# Patient Record
Sex: MALE | Race: BLACK OR AFRICAN AMERICAN | HISPANIC OR LATINO | Employment: OTHER | URBAN - METROPOLITAN AREA
[De-identification: names, ages, dates, MRNs, and addresses within clinical notes are randomized per-mention and may not be internally consistent; named-entity substitution may affect disease eponyms.]

---

## 2023-10-05 ENCOUNTER — APPOINTMENT (EMERGENCY)
Dept: CT IMAGING | Facility: HOSPITAL | Age: 63
End: 2023-10-05
Payer: COMMERCIAL

## 2023-10-05 ENCOUNTER — HOSPITAL ENCOUNTER (OUTPATIENT)
Facility: HOSPITAL | Age: 63
Setting detail: OBSERVATION
Discharge: HOME | End: 2023-10-06
Attending: EMERGENCY MEDICINE | Admitting: INTERNAL MEDICINE
Payer: COMMERCIAL

## 2023-10-05 DIAGNOSIS — E86.0 DEHYDRATION: ICD-10-CM

## 2023-10-05 DIAGNOSIS — R93.89 ABNORMAL COMPUTED TOMOGRAPHY ANGIOGRAPHY (CTA): ICD-10-CM

## 2023-10-05 DIAGNOSIS — R55 SYNCOPE: Primary | ICD-10-CM

## 2023-10-05 DIAGNOSIS — N17.9 AKI (ACUTE KIDNEY INJURY) (HCC): ICD-10-CM

## 2023-10-05 DIAGNOSIS — R45.1 AGITATION: ICD-10-CM

## 2023-10-05 DIAGNOSIS — Z86.73 CHRONIC CEREBROVASCULAR ACCIDENT (CVA): ICD-10-CM

## 2023-10-05 DIAGNOSIS — R55 SYNCOPE AND COLLAPSE: ICD-10-CM

## 2023-10-05 LAB
2HR DELTA HS TROPONIN: -1 NG/L
4HR DELTA HS TROPONIN: 8 NG/L
ALBUMIN SERPL BCP-MCNC: 4.2 G/DL (ref 3.5–5)
ALP SERPL-CCNC: 73 U/L (ref 34–104)
ALT SERPL W P-5'-P-CCNC: 9 U/L (ref 7–52)
ANION GAP SERPL CALCULATED.3IONS-SCNC: 7 MMOL/L
APTT PPP: 28 SECONDS (ref 23–37)
AST SERPL W P-5'-P-CCNC: 11 U/L (ref 13–39)
BASOPHILS # BLD AUTO: 0.05 THOUSANDS/ÂΜL (ref 0–0.1)
BASOPHILS NFR BLD AUTO: 0 % (ref 0–1)
BILIRUB SERPL-MCNC: 0.58 MG/DL (ref 0.2–1)
BNP SERPL-MCNC: 22 PG/ML (ref 0–100)
BUN SERPL-MCNC: 16 MG/DL (ref 5–25)
CALCIUM SERPL-MCNC: 9.6 MG/DL (ref 8.4–10.2)
CARDIAC TROPONIN I PNL SERPL HS: 11 NG/L
CARDIAC TROPONIN I PNL SERPL HS: 2 NG/L
CARDIAC TROPONIN I PNL SERPL HS: 3 NG/L
CHLORIDE SERPL-SCNC: 100 MMOL/L (ref 96–108)
CO2 SERPL-SCNC: 30 MMOL/L (ref 21–32)
CREAT SERPL-MCNC: 1.38 MG/DL (ref 0.6–1.3)
EOSINOPHIL # BLD AUTO: 0.17 THOUSAND/ÂΜL (ref 0–0.61)
EOSINOPHIL NFR BLD AUTO: 1 % (ref 0–6)
ERYTHROCYTE [DISTWIDTH] IN BLOOD BY AUTOMATED COUNT: 13.1 % (ref 11.6–15.1)
GFR SERPL CREATININE-BSD FRML MDRD: 53 ML/MIN/1.73SQ M
GLUCOSE SERPL-MCNC: 194 MG/DL (ref 65–140)
HCT VFR BLD AUTO: 44.1 % (ref 36.5–49.3)
HGB BLD-MCNC: 14 G/DL (ref 12–17)
IMM GRANULOCYTES # BLD AUTO: 0.07 THOUSAND/UL (ref 0–0.2)
IMM GRANULOCYTES NFR BLD AUTO: 0 % (ref 0–2)
INR PPP: 1.18 (ref 0.84–1.19)
LYMPHOCYTES # BLD AUTO: 1.42 THOUSANDS/ÂΜL (ref 0.6–4.47)
LYMPHOCYTES NFR BLD AUTO: 9 % (ref 14–44)
MAGNESIUM SERPL-MCNC: 1.6 MG/DL (ref 1.9–2.7)
MCH RBC QN AUTO: 28.6 PG (ref 26.8–34.3)
MCHC RBC AUTO-ENTMCNC: 31.7 G/DL (ref 31.4–37.4)
MCV RBC AUTO: 90 FL (ref 82–98)
MONOCYTES # BLD AUTO: 0.85 THOUSAND/ÂΜL (ref 0.17–1.22)
MONOCYTES NFR BLD AUTO: 5 % (ref 4–12)
NEUTROPHILS # BLD AUTO: 13.09 THOUSANDS/ÂΜL (ref 1.85–7.62)
NEUTS SEG NFR BLD AUTO: 85 % (ref 43–75)
NRBC BLD AUTO-RTO: 0 /100 WBCS
PLATELET # BLD AUTO: 286 THOUSANDS/UL (ref 149–390)
PMV BLD AUTO: 12 FL (ref 8.9–12.7)
POTASSIUM SERPL-SCNC: 4.2 MMOL/L (ref 3.5–5.3)
PROT SERPL-MCNC: 8.1 G/DL (ref 6.4–8.4)
PROTHROMBIN TIME: 15 SECONDS (ref 11.6–14.5)
RBC # BLD AUTO: 4.9 MILLION/UL (ref 3.88–5.62)
SODIUM SERPL-SCNC: 137 MMOL/L (ref 135–147)
TSH SERPL DL<=0.05 MIU/L-ACNC: 2.24 UIU/ML (ref 0.45–4.5)
WBC # BLD AUTO: 15.65 THOUSAND/UL (ref 4.31–10.16)

## 2023-10-05 PROCEDURE — 84484 ASSAY OF TROPONIN QUANT: CPT | Performed by: EMERGENCY MEDICINE

## 2023-10-05 PROCEDURE — 83880 ASSAY OF NATRIURETIC PEPTIDE: CPT | Performed by: EMERGENCY MEDICINE

## 2023-10-05 PROCEDURE — 93005 ELECTROCARDIOGRAM TRACING: CPT

## 2023-10-05 PROCEDURE — 99284 EMERGENCY DEPT VISIT MOD MDM: CPT

## 2023-10-05 PROCEDURE — 85730 THROMBOPLASTIN TIME PARTIAL: CPT | Performed by: EMERGENCY MEDICINE

## 2023-10-05 PROCEDURE — 83735 ASSAY OF MAGNESIUM: CPT | Performed by: EMERGENCY MEDICINE

## 2023-10-05 PROCEDURE — 85610 PROTHROMBIN TIME: CPT | Performed by: EMERGENCY MEDICINE

## 2023-10-05 PROCEDURE — 96361 HYDRATE IV INFUSION ADD-ON: CPT

## 2023-10-05 PROCEDURE — 99223 1ST HOSP IP/OBS HIGH 75: CPT | Performed by: NURSE PRACTITIONER

## 2023-10-05 PROCEDURE — 36415 COLL VENOUS BLD VENIPUNCTURE: CPT | Performed by: EMERGENCY MEDICINE

## 2023-10-05 PROCEDURE — 70498 CT ANGIOGRAPHY NECK: CPT

## 2023-10-05 PROCEDURE — 84443 ASSAY THYROID STIM HORMONE: CPT | Performed by: EMERGENCY MEDICINE

## 2023-10-05 PROCEDURE — 70496 CT ANGIOGRAPHY HEAD: CPT

## 2023-10-05 PROCEDURE — 80053 COMPREHEN METABOLIC PANEL: CPT | Performed by: EMERGENCY MEDICINE

## 2023-10-05 PROCEDURE — G1004 CDSM NDSC: HCPCS

## 2023-10-05 PROCEDURE — 96374 THER/PROPH/DIAG INJ IV PUSH: CPT

## 2023-10-05 PROCEDURE — 85025 COMPLETE CBC W/AUTO DIFF WBC: CPT | Performed by: EMERGENCY MEDICINE

## 2023-10-05 PROCEDURE — 99285 EMERGENCY DEPT VISIT HI MDM: CPT | Performed by: EMERGENCY MEDICINE

## 2023-10-05 RX ORDER — MECLIZINE HYDROCHLORIDE 25 MG/1
25 TABLET ORAL ONCE
Status: COMPLETED | OUTPATIENT
Start: 2023-10-05 | End: 2023-10-05

## 2023-10-05 RX ORDER — ONDANSETRON 2 MG/ML
4 INJECTION INTRAMUSCULAR; INTRAVENOUS ONCE
Status: COMPLETED | OUTPATIENT
Start: 2023-10-05 | End: 2023-10-05

## 2023-10-05 RX ORDER — LISINOPRIL 30 MG/1
30 TABLET ORAL DAILY
COMMUNITY

## 2023-10-05 RX ORDER — UREA 10 %
500 LOTION (ML) TOPICAL ONCE
Status: COMPLETED | OUTPATIENT
Start: 2023-10-05 | End: 2023-10-05

## 2023-10-05 RX ORDER — ATORVASTATIN CALCIUM 40 MG/1
40 TABLET, FILM COATED ORAL DAILY
COMMUNITY

## 2023-10-05 RX ORDER — CHLORTHALIDONE 25 MG/1
25 TABLET ORAL DAILY
COMMUNITY

## 2023-10-05 RX ORDER — METOPROLOL TARTRATE 50 MG/1
50 TABLET, FILM COATED ORAL EVERY 12 HOURS SCHEDULED
COMMUNITY

## 2023-10-05 RX ADMIN — IOHEXOL 85 ML: 350 INJECTION, SOLUTION INTRAVENOUS at 20:19

## 2023-10-05 RX ADMIN — ONDANSETRON 4 MG: 2 INJECTION INTRAMUSCULAR; INTRAVENOUS at 20:41

## 2023-10-05 RX ADMIN — Medication 500 MG: at 21:02

## 2023-10-05 RX ADMIN — SODIUM CHLORIDE 2000 ML: 0.9 INJECTION, SOLUTION INTRAVENOUS at 19:45

## 2023-10-05 RX ADMIN — MECLIZINE HYDROCHLORIDE 25 MG: 25 TABLET ORAL at 19:44

## 2023-10-05 NOTE — ED PROVIDER NOTES
History  Chief Complaint   Patient presents with   • Syncope     Family reports pt had syncopal episode earlier in the day, witnessed, did not injure self. Family states pt stood and "fainted". Pt denies any complaints. Family reports pt seems more fatigued than usual.     Patient is a 48-year-old male here with wife who is English-speaking only as well as patient and daughter who helps translate. Patient with a history of diabetes, hypothyroidism, hypertension hyperlipidemia came in today after syncopal event at home. Patient's wife witnessed this. She states that patient was standing up saying that he had to go have a bowel movement where he became dizzy, fatigued and hot and then passed out. Patient's wife lowered him to the ground he did not hit his head or lose consciousness. He was out several seconds but regained consciousness. No seizure-like activity. No tongue biting or loss of urine. No recent fevers, chills, night sweats. He has been taking his medications as prescribed without any changes in his medications. Patient and wife both deny that patient was complaining of any chest pain palpitations, lower extremity edema, tenderness. He had no focal weakness of the bilateral upper extremities or lower extremities. History provided by:  Patient, medical records and relative   used: Yes    Syncope  Episode history:  Single  Most recent episode:   Today  Timing:  Rare  Progression:  Resolved  Chronicity:  New  Context: standing up    Context: not blood draw, not bowel movement, not dehydration, not exertion, not inactivity, not medication change, not with normal activity, not sight of blood, not sitting down and not urination    Witnessed: yes    Relieved by:  None tried  Worsened by:  Nothing  Ineffective treatments:  None tried  Associated symptoms: dizziness and malaise/fatigue    Associated symptoms: no anxiety, no chest pain, no confusion, no diaphoresis, no difficulty breathing, no fever, no focal sensory loss, no focal weakness, no headaches, no nausea, no palpitations, no recent fall, no recent injury, no recent surgery, no rectal bleeding, no seizures, no shortness of breath, no visual change, no vomiting and no weakness    Risk factors: no congenital heart disease, no coronary artery disease, no seizures and no vascular disease        Prior to Admission Medications   Prescriptions Last Dose Informant Patient Reported? Taking?   atorvastatin (LIPITOR) 40 mg tablet   Yes Yes   Sig: Take 40 mg by mouth daily   chlorthalidone 25 mg tablet   Yes Yes   Sig: Take 25 mg by mouth daily   lisinopril (ZESTRIL) 30 mg tablet   Yes Yes   Sig: Take 30 mg by mouth daily   metFORMIN (GLUCOPHAGE) 1000 MG tablet   Yes Yes   Sig: Take 1,000 mg by mouth 2 (two) times a day with meals   metoprolol tartrate (LOPRESSOR) 50 mg tablet   Yes Yes   Sig: Take 50 mg by mouth every 12 (twelve) hours   sitaGLIPtin (JANUVIA) 100 mg tablet   Yes Yes   Sig: Take 100 mg by mouth daily      Facility-Administered Medications: None       Past Medical History:   Diagnosis Date   • Blind    • Diabetes mellitus ([address])    • Disease of thyroid gland    • Hyperlipidemia    • Hypertension        History reviewed. No pertinent surgical history. History reviewed. No pertinent family history. I have reviewed and agree with the history as documented. E-Cigarette/Vaping   • E-Cigarette Use Never User      E-Cigarette/Vaping Substances     Social History     Tobacco Use   • Smoking status: Never   • Smokeless tobacco: Never   Vaping Use   • Vaping Use: Never used   Substance Use Topics   • Alcohol use: Not Currently   • Drug use: Not Currently       Review of Systems   Constitutional: Positive for malaise/fatigue. Negative for diaphoresis and fever. HENT: Negative. Eyes: Negative. Respiratory: Negative. Negative for shortness of breath. Cardiovascular: Positive for syncope.  Negative for chest pain and palpitations. Gastrointestinal: Negative. Negative for nausea and vomiting. Musculoskeletal: Negative. Skin: Negative. Neurological: Positive for dizziness and syncope. Negative for focal weakness, seizures, weakness and headaches. Hematological: Negative. Psychiatric/Behavioral: Negative. Negative for confusion. All other systems reviewed and are negative. Physical Exam  Physical Exam  Vitals and nursing note reviewed. Constitutional:       General: He is not in acute distress. Appearance: He is well-developed. Comments: Patient appears older than stated   HENT:      Head: Normocephalic and atraumatic. Comments: Patient maintaining airway and secretions. No stridor . No brawniness under tongue. Nose: Nose normal.      Mouth/Throat:      Mouth: Mucous membranes are dry. Eyes:      Extraocular Movements: Extraocular movements intact. Conjunctiva/sclera: Conjunctivae normal.      Pupils: Pupils are equal, round, and reactive to light. Cardiovascular:      Rate and Rhythm: Normal rate and regular rhythm. Pulses:           Radial pulses are 2+ on the right side and 2+ on the left side. Dorsalis pedis pulses are 2+ on the right side and 2+ on the left side. Heart sounds: Normal heart sounds, S1 normal and S2 normal. No murmur heard. Pulmonary:      Effort: Pulmonary effort is normal. No respiratory distress. Breath sounds: Normal breath sounds. Abdominal:      Palpations: Abdomen is soft. Tenderness: There is no abdominal tenderness. Musculoskeletal:         General: No swelling. Normal range of motion. Cervical back: Neck supple. Right lower leg: No edema. Left lower leg: No edema. Skin:     General: Skin is warm and dry. Capillary Refill: Capillary refill takes less than 2 seconds. Neurological:      General: No focal deficit present. Mental Status: He is alert and oriented to person, place, and time. GCS: GCS eye subscore is 4. GCS verbal subscore is 5. GCS motor subscore is 6. Cranial Nerves: Cranial nerves 2-12 are intact. Sensory: Sensation is intact. Motor: Motor function is intact. Coordination: Coordination is intact. Comments: No slurred speech. No facial asymmetry. No tongue deviation. Patient can move bilateral extremities and lower extremities independently and spontaneously bilateral pain-free. Patient with independent bed mobility as well   Psychiatric:         Mood and Affect: Mood normal.         Behavior: Behavior normal.         Thought Content:  Thought content normal.         Judgment: Judgment normal.         Vital Signs  ED Triage Vitals   Temperature Pulse Respirations Blood Pressure SpO2   10/05/23 1823 10/05/23 1822 10/05/23 1822 10/05/23 1822 10/05/23 1822   98.3 °F (36.8 °C) 76 16 143/83 99 %      Temp src Heart Rate Source Patient Position - Orthostatic VS BP Location FiO2 (%)   -- 10/05/23 1822 10/05/23 1822 10/05/23 1822 --    Monitor Lying Right arm       Pain Score       10/05/23 1822       No Pain           Vitals:    10/05/23 1822 10/05/23 2130 10/05/23 2200   BP: 143/83 146/88 146/82   Pulse: 76 80 80   Patient Position - Orthostatic VS: Lying Lying Lying         Visual Acuity      ED Medications  Medications   sodium chloride 0.9 % bolus 2,000 mL (0 mL Intravenous Stopped 10/5/23 2149)   meclizine (ANTIVERT) tablet 25 mg (25 mg Oral Given 10/5/23 1944)   magnesium gluconate (MAGONATE) tablet 500 mg (500 mg Oral Given 10/5/23 2102)   iohexol (OMNIPAQUE) 350 MG/ML injection (MULTI-DOSE) 85 mL (85 mL Intravenous Given 10/5/23 2019)   ondansetron (ZOFRAN) injection 4 mg (4 mg Intravenous Given 10/5/23 2041)       Diagnostic Studies  Results Reviewed     Procedure Component Value Units Date/Time    HS Troponin I 2hr [[id number]] Collected: 10/05/23 2101    Lab Status: No result Specimen: Blood from Line, Venous     B-Type Natriuretic Peptide(BNP) [[id number]]  (Normal) Collected: 10/05/23 1901    Lab Status: Final result Specimen: Blood from Arm, Left Updated: 10/05/23 2015     BNP 22 pg/mL     HS Troponin I 4hr [[id number]]     Lab Status: No result Specimen: Blood     TSH [[id number]]  (Normal) Collected: 10/05/23 1901    Lab Status: Final result Specimen: Blood from Arm, Left Updated: 10/05/23 2000     TSH 3RD GENERATON 2.239 uIU/mL     Comprehensive metabolic panel [[id number]]  (Abnormal) Collected: 10/05/23 1901    Lab Status: Final result Specimen: Blood from Arm, Left Updated: 10/05/23 1950     Sodium 137 mmol/L      Potassium 4.2 mmol/L      Chloride 100 mmol/L      CO2 30 mmol/L      ANION GAP 7 mmol/L      BUN 16 mg/dL      Creatinine 1.38 mg/dL      Glucose 194 mg/dL      Calcium 9.6 mg/dL      AST 11 U/L      ALT 9 U/L      Alkaline Phosphatase 73 U/L      Total Protein 8.1 g/dL      Albumin 4.2 g/dL      Total Bilirubin 0.58 mg/dL      eGFR 53 ml/min/1.73sq m     Narrative:      [place] guidelines for Chronic Kidney Disease (CKD):   •  Stage 1 with normal or high GFR (GFR > 90 mL/min/1.73 square meters)  •  Stage 2 Mild CKD (GFR = 60-89 mL/min/1.73 square meters)  •  Stage 3A Moderate CKD (GFR = 45-59 mL/min/1.73 square meters)  •  Stage 3B Moderate CKD (GFR = 30-44 mL/min/1.73 square meters)  •  Stage 4 Severe CKD (GFR = 15-29 mL/min/1.73 square meters)  •  Stage 5 End Stage CKD (GFR <15 mL/min/1.73 square meters)  Note: GFR calculation is accurate only with a steady state creatinine    Magnesium [[id number]]  (Abnormal) Collected: 10/05/23 1901    Lab Status: Final result Specimen: Blood from Arm, Left Updated: 10/05/23 1950     Magnesium 1.6 mg/dL     HS Troponin 0hr (reflex protocol) [[id number]]  (Normal) Collected: 10/05/23 1901    Lab Status: Final result Specimen: Blood from Arm, Left Updated: 10/05/23 1949     hs TnI 0hr 3 ng/L     Protime-INR [[id number]]  (Abnormal) Collected: 10/05/23 1901    Lab Status: Final result Specimen: Blood from Arm, Left Updated: 10/05/23 1942     Protime 15.0 seconds      INR 1.18    APTT [[id number]]  (Normal) Collected: 10/05/23 1901    Lab Status: Final result Specimen: Blood from Arm, Left Updated: 10/05/23 1942     PTT 28 seconds     CBC and differential [[id number]]  (Abnormal) Collected: 10/05/23 1901    Lab Status: Final result Specimen: Blood from Arm, Left Updated: 10/05/23 1922     WBC 15.65 Thousand/uL      RBC 4.90 Million/uL      Hemoglobin 14.0 g/dL      Hematocrit 44.1 %      MCV 90 fL      MCH 28.6 pg      MCHC 31.7 g/dL      RDW 13.1 %      MPV 12.0 fL      Platelets 419 Thousands/uL      nRBC 0 /100 WBCs      Neutrophils Relative 85 %      Immat GRANS % 0 %      Lymphocytes Relative 9 %      Monocytes Relative 5 %      Eosinophils Relative 1 %      Basophils Relative 0 %      Neutrophils Absolute 13.09 Thousands/µL      Immature Grans Absolute 0.07 Thousand/uL      Lymphocytes Absolute 1.42 Thousands/µL      Monocytes Absolute 0.85 Thousand/µL      Eosinophils Absolute 0.17 Thousand/µL      Basophils Absolute 0.05 Thousands/µL                  CTA head and neck with and without contrast   Final Result by Rolando Ramos MD (10/05 2117)      Age-indeterminate lacunar infarct in right heidy - no comparison imaging at time of examination. This may be subacute or chronic in age. Recommend MRI brain without contrast for further evaluation. Severe chronic microangiopathy. Negative CTA head and neck for large vessel occlusion, dissection, aneurysm, or high-grade stenosis. Additional chronic/incidental findings as detailed above. The study was marked in [facility] for immediate notification. Workstation performed: [id number]                    Procedures  Procedures         ED Course  ED Course as of 10/05/23 2230   Thu Oct 05, 2023   1921 Patient is a 58 year male coming in today after syncopal event at home.   On exam alert oriented x3 no focal deficits. 0. We will start cardiac walker as well as CT angio head and neck. Patient family agreeable      Patient with no abnormal EKG    Disclosure: Voice to text software was used in the preparation of this document and could have resulted in translational errors.      Occasional wrong word or "sound a like" substitutions may have occurred due to the inherent limitations of voice recognition software.  Read the chart carefully and recognize, using context, where substitutions have occurred.       I have independently reviewed external records are available to me to the level of detail possible within the time constraints of my patient care responsibilities in the ED.       2006 Perative care everywhere patient's last creatinine was 0.8 in January 2023 now increased to 1.38. We will continue with 2 L IV fluid. Magnesium mildly low as well. Mild leukocytosis without evident evidence of bacteremia. 2036 Report that patient came back from CT and vomited. Will give Zofran. 2055 Patient resting in bed and states that he had a CT and just felt warm and vomited. He had no rashes or shortness of breath. No angioedema noted. Patient feels better after IV fluids and Zofran. 2156 Patient and family updated on CT results and agreeable. Patient's last CT head without contrast was in August 2022. No acute findings at that time except for cerebral atrophy. They are agreeable. We will reach out to spine for further stroke work-up   2215 Discussed with Ryann Waldron. We had a detailed discussion of the patient's condition and case,  including need for admission. Accepts to his/her service. Bed request/bridging orders placed.                  HEART Risk Score    Flowsheet Row Most Recent Value   Heart Score Risk Calculator    History 0 Filed at: 10/05/2023 2008   ECG 1 Filed at: 10/05/2023 2008   Age 1 Filed at: 10/05/2023 2008   Risk Factors 2 Filed at: 10/05/2023 2008   Troponin 0 Filed at: 10/05/2023 2008   HEART Score 4 Filed at: 10/05/2023 2008           Stroke Assessment     Row Name 10/05/23 1933             NIH Stroke Scale    Interval Baseline      Level of Consciousness (1a.) 0      LOC Questions (1b.) 0      LOC Commands (1c.) 0      Best Gaze (2.) 0      Visual (3.) 0      Facial Palsy (4.) 0      Motor Arm, Left (5a.) 0      Motor Arm, Right (5b.) 0      Motor Leg, Left (6a.) 0      Motor Leg, Right (6b.) 0      Limb Ataxia (7.) 0      Sensory (8.) 0      Best Language (9.) 0      Dysarthria (10.) 0      Extinction and Inattention (11.) (Formerly Neglect) 0      Total 0              Flowsheet Row Most Recent Value   Thrombolytic Decision Options    Thrombolytic Decision Patient not a candidate. Patient is not a candidate options Symptoms resolved/clearly non disabling. SBIRT 20yo+    Flowsheet Row Most Recent Value   Initial Alcohol Screen: US AUDIT-C     1. How often do you have a drink containing alcohol? 0 Filed at: 10/05/2023 2110   2. How many drinks containing alcohol do you have on a typical day you are drinking? 0 Filed at: 10/05/2023 2110   3a. Male UNDER 65: How often do you have five or more drinks on one occasion? 0 Filed at: 10/05/2023 2110   3b. FEMALE Any Age, or MALE 65+: How often do you have 4 or more drinks on one occassion? 0 Filed at: 10/05/2023 2110   Audit-C Score 0 Filed at: 10/05/2023 2110   CHING: How many times in the past year have you. .. Used an illegal drug or used a prescription medication for non-medical reasons? Never Filed at: 10/05/2023 2110                    Medical Decision Making      EKG INTERPRETATION @ 1918  RHYTHM:NSR @ 77 bpm  AXIS: normal axis  INTERVALS: VT @ 150 ms  QRS COMPLEX: QRS @ 84 ms  ST SEGMENT: nonspecific st segment changes.  Diffuse artifact  QT INTERVAL: QTC @ 398 ms  COMPARED WITH PRIOR no old   Interpretation by Charissa Mae DO    Differential diagnosis includes but not limited to: Arrhythmia, electrolyte disturbance, vasovagal, obstructive cardiomyopathy, saddle PE, PE, aortic dissection, pneumonia, valvular disorder, depletion, alcohol, toxicologic, seizure, hypertension, psychogenic        Amount and/or Complexity of Data Reviewed  Independent Historian: spouse     Details: Wife and daughter at bedside  Labs: ordered. Decision-making details documented in ED Course. Details: Mild leukocytosis without any bands. No anemia. No thrombocytopenia. Magnesium mildly low but no other electrolyte abnormality  Creatinine mildly increased compared to old. Thyroid within normal limits. Troponin 3  Radiology: ordered. Decision-making details documented in ED Course. Details: CT angio head and neck interpreted by radiologist as  ECG/medicine tests: ordered and independent interpretation performed. Decision-making details documented in ED Course. Details: no abnormal rhythm or ischmeia       Risk  OTC drugs. Disposition  Final diagnoses:   Syncope   DIEGO (acute kidney injury) ([address])   Dehydration   Abnormal computed tomography angiography (CTA)     Time reflects when diagnosis was documented in both MDM as applicable and the Disposition within this note     Time User Action Codes Description Comment    10/5/2023  7:36 PM Chante Martines Add [R55] Syncope     10/5/2023  8:08 PM Brian Martines Add [N17.9] DIEGO (acute kidney injury) ([address])     10/5/2023  8:08 PM Jacinda Martines Add [E86.0] Dehydration     10/5/2023 10:16 PM Chante Martines Add [R93.89] Abnormal computed tomography angiography (CTA)       ED Disposition     ED Disposition   Admit    Condition   Stable    Date/Time   u Oct 5, 2023 10:15 PM    Comment   Case was discussed with Cecilia and the patient's admission status was agreed to be Admission Status: observation status to the service of Dr. Thao Matson .            Follow-up Information    None         Patient's Medications   Discharge Prescriptions    No medications on file       No discharge procedures on file.    PDMP Review     None          ED Provider  Electronically Signed by           Agusto Cheung DO  10/05/23 1172

## 2023-10-06 ENCOUNTER — APPOINTMENT (OUTPATIENT)
Dept: RADIOLOGY | Facility: HOSPITAL | Age: 63
End: 2023-10-06
Payer: COMMERCIAL

## 2023-10-06 VITALS
WEIGHT: 151.68 LBS | DIASTOLIC BLOOD PRESSURE: 104 MMHG | BODY MASS INDEX: 24.38 KG/M2 | RESPIRATION RATE: 16 BRPM | TEMPERATURE: 99 F | OXYGEN SATURATION: 95 % | HEART RATE: 91 BPM | SYSTOLIC BLOOD PRESSURE: 163 MMHG | HEIGHT: 66 IN

## 2023-10-06 PROBLEM — R55 SYNCOPE AND COLLAPSE: Status: RESOLVED | Noted: 2023-10-05 | Resolved: 2023-10-06

## 2023-10-06 PROBLEM — R11.10 VOMITING AND DIARRHEA: Status: ACTIVE | Noted: 2023-10-06

## 2023-10-06 PROBLEM — D72.9 NEUTROPHILIC LEUKOCYTOSIS: Status: ACTIVE | Noted: 2023-10-06

## 2023-10-06 PROBLEM — R19.7 VOMITING AND DIARRHEA: Status: ACTIVE | Noted: 2023-10-06

## 2023-10-06 PROBLEM — H54.8 LEGALLY BLIND: Status: ACTIVE | Noted: 2023-10-06

## 2023-10-06 PROBLEM — N17.9 ACUTE KIDNEY INJURY (HCC): Status: ACTIVE | Noted: 2023-10-06

## 2023-10-06 PROBLEM — N17.9 ACUTE KIDNEY INJURY (HCC): Status: RESOLVED | Noted: 2023-10-06 | Resolved: 2023-10-06

## 2023-10-06 PROBLEM — E83.42 HYPOMAGNESEMIA: Status: ACTIVE | Noted: 2023-10-06

## 2023-10-06 PROBLEM — E03.8 OTHER SPECIFIED HYPOTHYROIDISM: Chronic | Status: ACTIVE | Noted: 2023-10-06

## 2023-10-06 PROBLEM — I10 ESSENTIAL (PRIMARY) HYPERTENSION: Chronic | Status: ACTIVE | Noted: 2023-10-06

## 2023-10-06 PROBLEM — E78.2 MIXED HYPERLIPIDEMIA: Chronic | Status: ACTIVE | Noted: 2023-10-06

## 2023-10-06 PROBLEM — E11.65 TYPE 2 DIABETES MELLITUS WITH HYPERGLYCEMIA, WITHOUT LONG-TERM CURRENT USE OF INSULIN (HCC): Chronic | Status: ACTIVE | Noted: 2023-10-06

## 2023-10-06 PROBLEM — E83.42 HYPOMAGNESEMIA: Status: RESOLVED | Noted: 2023-10-06 | Resolved: 2023-10-06

## 2023-10-06 PROBLEM — F79 INTELLECTUAL DISABILITY: Chronic | Status: ACTIVE | Noted: 2023-10-06

## 2023-10-06 PROBLEM — R19.7 VOMITING AND DIARRHEA: Status: RESOLVED | Noted: 2023-10-06 | Resolved: 2023-10-06

## 2023-10-06 PROBLEM — R11.10 VOMITING AND DIARRHEA: Status: RESOLVED | Noted: 2023-10-06 | Resolved: 2023-10-06

## 2023-10-06 LAB
ALBUMIN SERPL BCP-MCNC: 3.7 G/DL (ref 3.5–5)
ALP SERPL-CCNC: 61 U/L (ref 34–104)
ALT SERPL W P-5'-P-CCNC: 7 U/L (ref 7–52)
ANION GAP SERPL CALCULATED.3IONS-SCNC: 10 MMOL/L
AST SERPL W P-5'-P-CCNC: 10 U/L (ref 13–39)
ATRIAL RATE: 77 BPM
ATRIAL RATE: 85 BPM
BACTERIA UR QL AUTO: ABNORMAL /HPF
BASOPHILS # BLD AUTO: 0.03 THOUSANDS/ÂΜL (ref 0–0.1)
BASOPHILS NFR BLD AUTO: 0 % (ref 0–1)
BILIRUB SERPL-MCNC: 0.48 MG/DL (ref 0.2–1)
BILIRUB UR QL STRIP: NEGATIVE
BUN SERPL-MCNC: 18 MG/DL (ref 5–25)
CALCIUM SERPL-MCNC: 8.6 MG/DL (ref 8.4–10.2)
CHLORIDE SERPL-SCNC: 107 MMOL/L (ref 96–108)
CHOLEST SERPL-MCNC: 57 MG/DL
CLARITY UR: CLEAR
CO2 SERPL-SCNC: 20 MMOL/L (ref 21–32)
COLOR UR: ABNORMAL
CREAT SERPL-MCNC: 1.17 MG/DL (ref 0.6–1.3)
EOSINOPHIL # BLD AUTO: 0.04 THOUSAND/ÂΜL (ref 0–0.61)
EOSINOPHIL NFR BLD AUTO: 0 % (ref 0–6)
ERYTHROCYTE [DISTWIDTH] IN BLOOD BY AUTOMATED COUNT: 13.2 % (ref 11.6–15.1)
EST. AVERAGE GLUCOSE BLD GHB EST-MCNC: 169 MG/DL
GFR SERPL CREATININE-BSD FRML MDRD: 65 ML/MIN/1.73SQ M
GLUCOSE SERPL-MCNC: 155 MG/DL (ref 65–140)
GLUCOSE SERPL-MCNC: 160 MG/DL (ref 65–140)
GLUCOSE SERPL-MCNC: 172 MG/DL (ref 65–140)
GLUCOSE SERPL-MCNC: 172 MG/DL (ref 65–140)
GLUCOSE UR STRIP-MCNC: NEGATIVE MG/DL
HBA1C MFR BLD: 7.5 %
HCT VFR BLD AUTO: 40.6 % (ref 36.5–49.3)
HDLC SERPL-MCNC: 30 MG/DL
HGB BLD-MCNC: 13.1 G/DL (ref 12–17)
HGB UR QL STRIP.AUTO: NEGATIVE
IMM GRANULOCYTES # BLD AUTO: 0.05 THOUSAND/UL (ref 0–0.2)
IMM GRANULOCYTES NFR BLD AUTO: 0 % (ref 0–2)
KETONES UR STRIP-MCNC: NEGATIVE MG/DL
LDLC SERPL CALC-MCNC: 20 MG/DL (ref 0–100)
LEUKOCYTE ESTERASE UR QL STRIP: ABNORMAL
LIPASE SERPL-CCNC: 6 U/L (ref 11–82)
LYMPHOCYTES # BLD AUTO: 0.82 THOUSANDS/ÂΜL (ref 0.6–4.47)
LYMPHOCYTES NFR BLD AUTO: 6 % (ref 14–44)
MAGNESIUM SERPL-MCNC: 2.2 MG/DL (ref 1.9–2.7)
MCH RBC QN AUTO: 28.9 PG (ref 26.8–34.3)
MCHC RBC AUTO-ENTMCNC: 32.3 G/DL (ref 31.4–37.4)
MCV RBC AUTO: 89 FL (ref 82–98)
MONOCYTES # BLD AUTO: 1.21 THOUSAND/ÂΜL (ref 0.17–1.22)
MONOCYTES NFR BLD AUTO: 9 % (ref 4–12)
MUCOUS THREADS UR QL AUTO: ABNORMAL
NEUTROPHILS # BLD AUTO: 11.22 THOUSANDS/ÂΜL (ref 1.85–7.62)
NEUTS SEG NFR BLD AUTO: 85 % (ref 43–75)
NITRITE UR QL STRIP: NEGATIVE
NON-SQ EPI CELLS URNS QL MICRO: ABNORMAL /HPF
NONHDLC SERPL-MCNC: 27 MG/DL
NRBC BLD AUTO-RTO: 0 /100 WBCS
P AXIS: 66 DEGREES
P AXIS: 85 DEGREES
PH UR STRIP.AUTO: 5.5 [PH]
PLATELET # BLD AUTO: 255 THOUSANDS/UL (ref 149–390)
PMV BLD AUTO: 11.5 FL (ref 8.9–12.7)
POTASSIUM SERPL-SCNC: 3.7 MMOL/L (ref 3.5–5.3)
PR INTERVAL: 150 MS
PR INTERVAL: 164 MS
PROCALCITONIN SERPL-MCNC: 0.21 NG/ML
PROT SERPL-MCNC: 7 G/DL (ref 6.4–8.4)
PROT UR STRIP-MCNC: ABNORMAL MG/DL
QRS AXIS: 67 DEGREES
QRS AXIS: 70 DEGREES
QRSD INTERVAL: 84 MS
QRSD INTERVAL: 84 MS
QT INTERVAL: 352 MS
QT INTERVAL: 366 MS
QTC INTERVAL: 398 MS
QTC INTERVAL: 435 MS
RBC # BLD AUTO: 4.54 MILLION/UL (ref 3.88–5.62)
RBC #/AREA URNS AUTO: ABNORMAL /HPF
SODIUM SERPL-SCNC: 137 MMOL/L (ref 135–147)
SP GR UR STRIP.AUTO: >=1.05 (ref 1–1.03)
T WAVE AXIS: -6 DEGREES
T WAVE AXIS: 38 DEGREES
TRIGL SERPL-MCNC: 36 MG/DL
UROBILINOGEN UR STRIP-ACNC: <2 MG/DL
VENTRICULAR RATE: 77 BPM
VENTRICULAR RATE: 85 BPM
WBC # BLD AUTO: 13.37 THOUSAND/UL (ref 4.31–10.16)
WBC #/AREA URNS AUTO: ABNORMAL /HPF

## 2023-10-06 PROCEDURE — 99223 1ST HOSP IP/OBS HIGH 75: CPT

## 2023-10-06 PROCEDURE — 97167 OT EVAL HIGH COMPLEX 60 MIN: CPT

## 2023-10-06 PROCEDURE — 81001 URINALYSIS AUTO W/SCOPE: CPT | Performed by: NURSE PRACTITIONER

## 2023-10-06 PROCEDURE — 83036 HEMOGLOBIN GLYCOSYLATED A1C: CPT | Performed by: PHYSICIAN ASSISTANT

## 2023-10-06 PROCEDURE — 99245 OFF/OP CONSLTJ NEW/EST HI 55: CPT | Performed by: PSYCHIATRY & NEUROLOGY

## 2023-10-06 PROCEDURE — 85025 COMPLETE CBC W/AUTO DIFF WBC: CPT | Performed by: NURSE PRACTITIONER

## 2023-10-06 PROCEDURE — NC001 PR NO CHARGE: Performed by: PHYSICIAN ASSISTANT

## 2023-10-06 PROCEDURE — 71045 X-RAY EXAM CHEST 1 VIEW: CPT

## 2023-10-06 PROCEDURE — 97163 PT EVAL HIGH COMPLEX 45 MIN: CPT

## 2023-10-06 PROCEDURE — 82948 REAGENT STRIP/BLOOD GLUCOSE: CPT

## 2023-10-06 PROCEDURE — 99239 HOSP IP/OBS DSCHRG MGMT >30: CPT | Performed by: PHYSICIAN ASSISTANT

## 2023-10-06 PROCEDURE — 80061 LIPID PANEL: CPT | Performed by: PHYSICIAN ASSISTANT

## 2023-10-06 PROCEDURE — 83735 ASSAY OF MAGNESIUM: CPT | Performed by: NURSE PRACTITIONER

## 2023-10-06 PROCEDURE — 80053 COMPREHEN METABOLIC PANEL: CPT | Performed by: NURSE PRACTITIONER

## 2023-10-06 PROCEDURE — 93010 ELECTROCARDIOGRAM REPORT: CPT | Performed by: INTERNAL MEDICINE

## 2023-10-06 PROCEDURE — 83690 ASSAY OF LIPASE: CPT | Performed by: NURSE PRACTITIONER

## 2023-10-06 PROCEDURE — 84145 PROCALCITONIN (PCT): CPT | Performed by: NURSE PRACTITIONER

## 2023-10-06 RX ORDER — DOCUSATE SODIUM 100 MG/1
100 CAPSULE, LIQUID FILLED ORAL 2 TIMES DAILY
Status: DISCONTINUED | OUTPATIENT
Start: 2023-10-06 | End: 2023-10-06

## 2023-10-06 RX ORDER — LEVOTHYROXINE SODIUM 0.07 MG/1
75 TABLET ORAL
COMMUNITY

## 2023-10-06 RX ORDER — ACETAMINOPHEN 325 MG/1
650 TABLET ORAL EVERY 6 HOURS PRN
Status: DISCONTINUED | OUTPATIENT
Start: 2023-10-06 | End: 2023-10-06 | Stop reason: HOSPADM

## 2023-10-06 RX ORDER — SIMETHICONE 80 MG
80 TABLET,CHEWABLE ORAL 4 TIMES DAILY PRN
Status: DISCONTINUED | OUTPATIENT
Start: 2023-10-06 | End: 2023-10-06 | Stop reason: HOSPADM

## 2023-10-06 RX ORDER — INSULIN LISPRO 100 [IU]/ML
1-5 INJECTION, SOLUTION INTRAVENOUS; SUBCUTANEOUS
Status: DISCONTINUED | OUTPATIENT
Start: 2023-10-06 | End: 2023-10-06 | Stop reason: HOSPADM

## 2023-10-06 RX ORDER — MAGNESIUM HYDROXIDE/ALUMINUM HYDROXICE/SIMETHICONE 120; 1200; 1200 MG/30ML; MG/30ML; MG/30ML
30 SUSPENSION ORAL EVERY 6 HOURS PRN
Status: DISCONTINUED | OUTPATIENT
Start: 2023-10-06 | End: 2023-10-06 | Stop reason: HOSPADM

## 2023-10-06 RX ORDER — ENOXAPARIN SODIUM 100 MG/ML
40 INJECTION SUBCUTANEOUS DAILY
Status: DISCONTINUED | OUTPATIENT
Start: 2023-10-06 | End: 2023-10-06 | Stop reason: HOSPADM

## 2023-10-06 RX ORDER — MAGNESIUM SULFATE HEPTAHYDRATE 40 MG/ML
2 INJECTION, SOLUTION INTRAVENOUS ONCE
Status: COMPLETED | OUTPATIENT
Start: 2023-10-06 | End: 2023-10-06

## 2023-10-06 RX ORDER — LEVOTHYROXINE SODIUM 0.07 MG/1
75 TABLET ORAL
Status: DISCONTINUED | OUTPATIENT
Start: 2023-10-06 | End: 2023-10-06 | Stop reason: HOSPADM

## 2023-10-06 RX ORDER — OLANZAPINE 5 MG/1
2.5 TABLET ORAL EVERY 8 HOURS PRN
Status: DISCONTINUED | OUTPATIENT
Start: 2023-10-06 | End: 2023-10-06 | Stop reason: HOSPADM

## 2023-10-06 RX ORDER — DOCUSATE SODIUM 100 MG/1
100 CAPSULE, LIQUID FILLED ORAL 2 TIMES DAILY
COMMUNITY

## 2023-10-06 RX ORDER — METOPROLOL TARTRATE 50 MG/1
50 TABLET, FILM COATED ORAL EVERY 12 HOURS SCHEDULED
Status: DISCONTINUED | OUTPATIENT
Start: 2023-10-06 | End: 2023-10-06 | Stop reason: HOSPADM

## 2023-10-06 RX ORDER — ONDANSETRON 2 MG/ML
4 INJECTION INTRAMUSCULAR; INTRAVENOUS EVERY 6 HOURS PRN
Status: DISCONTINUED | OUTPATIENT
Start: 2023-10-06 | End: 2023-10-06 | Stop reason: HOSPADM

## 2023-10-06 RX ORDER — ATORVASTATIN CALCIUM 40 MG/1
40 TABLET, FILM COATED ORAL
Status: DISCONTINUED | OUTPATIENT
Start: 2023-10-06 | End: 2023-10-06 | Stop reason: HOSPADM

## 2023-10-06 RX ORDER — OLANZAPINE 10 MG/1
2.5 INJECTION, POWDER, LYOPHILIZED, FOR SOLUTION INTRAMUSCULAR ONCE
Status: COMPLETED | OUTPATIENT
Start: 2023-10-06 | End: 2023-10-06

## 2023-10-06 RX ORDER — OLANZAPINE 10 MG/1
2.5 INJECTION, POWDER, LYOPHILIZED, FOR SOLUTION INTRAMUSCULAR EVERY 8 HOURS PRN
Status: DISCONTINUED | OUTPATIENT
Start: 2023-10-06 | End: 2023-10-06 | Stop reason: HOSPADM

## 2023-10-06 RX ORDER — SODIUM CHLORIDE 9 MG/ML
100 INJECTION, SOLUTION INTRAVENOUS CONTINUOUS
Status: DISCONTINUED | OUTPATIENT
Start: 2023-10-06 | End: 2023-10-06

## 2023-10-06 RX ORDER — QUETIAPINE FUMARATE 25 MG/1
12.5 TABLET, FILM COATED ORAL
Status: DISCONTINUED | OUTPATIENT
Start: 2023-10-06 | End: 2023-10-06 | Stop reason: HOSPADM

## 2023-10-06 RX ADMIN — ASPIRIN 81 MG: 81 TABLET, COATED ORAL at 09:57

## 2023-10-06 RX ADMIN — METOPROLOL TARTRATE 50 MG: 50 TABLET, FILM COATED ORAL at 09:57

## 2023-10-06 RX ADMIN — OLANZAPINE 2.5 MG: 10 INJECTION, POWDER, LYOPHILIZED, FOR SOLUTION INTRAMUSCULAR at 08:33

## 2023-10-06 RX ADMIN — SODIUM CHLORIDE 100 ML/HR: 0.9 INJECTION, SOLUTION INTRAVENOUS at 03:32

## 2023-10-06 RX ADMIN — INSULIN LISPRO 1 UNITS: 100 INJECTION, SOLUTION INTRAVENOUS; SUBCUTANEOUS at 01:45

## 2023-10-06 RX ADMIN — INSULIN LISPRO 1 UNITS: 100 INJECTION, SOLUTION INTRAVENOUS; SUBCUTANEOUS at 12:20

## 2023-10-06 RX ADMIN — ONDANSETRON 4 MG: 2 INJECTION INTRAMUSCULAR; INTRAVENOUS at 03:44

## 2023-10-06 RX ADMIN — ENOXAPARIN SODIUM 40 MG: 40 INJECTION SUBCUTANEOUS at 09:57

## 2023-10-06 RX ADMIN — MAGNESIUM SULFATE HEPTAHYDRATE 2 G: 40 INJECTION, SOLUTION INTRAVENOUS at 03:31

## 2023-10-06 RX ADMIN — INSULIN LISPRO 1 UNITS: 100 INJECTION, SOLUTION INTRAVENOUS; SUBCUTANEOUS at 08:40

## 2023-10-06 NOTE — APP STUDENT NOTE
AD STUDENT  Inpatient Progress Note for TRAINING ONLY  Not Part of Legal Medical Record       H&P Exam - Jerald Jensen 58 y.o. male MRN: [id number]    Unit/Bed#: E5 -01 Encounter: [id number]    Assessment & plan:  · Syncope & collapse  · Syncopal episode at home  · Wife witnessed episode of unresponsiveness by pt, reports she lowered him to ground with no hitting head or LOC  · 10/5/23 CTA H/N  · Age-indeterminate lacunar infarct in right heidy - no comparison imaging at time of examination. This may be subacute or chronic in age. Recommend MRI brain without contrast for further evaluation. · Severe chronic microangiopathy. · Negative CTA head and neck for large vessel occlusion, dissection, aneurysm, or high-grade stenosis. · MRI head per rec  · Hold home metoprolol, lisinopril, chlorthalidone for permissive HTN  · ASA 81 mg daily  · EKG, monitor on tele  · Orthostatic VS  · Q4 neurochecks  · Neuro consult  · PT, OT, SLP consult    · Essential hypertension  · Hold home metoprolol, lisinopril, chlorthalidone for permissive HTN    · Mixed hyperlipidemia  · Continue home atorvastatin  · Morning lipid panel    · Other specified hypothyroidism  · Continue home levothyroxine 75 mcg daily    · Acute kidney injury  · Creat = 1.38, baseline ~ 0.8  · Hold chlorthalidone, lisinopril  · IVF, repeat AM BMP  · I/O  · Avoid NSAIDs, nephrotoxic agents    · Type 2 diabetes mellitus without complication, without long-term current use of insulin  · Maintained on metformin 1000 mg BID, sitagliptin phosphate 100 mg daily  · Hold home meds  · SSI & POC ACHS  · AM A1C    · Hypomagnesemia  · Mag sulfate 2g IV  · AM Mg    · Neutrophilic leukocytosis  · WBC = 15.6, ANC = 13  · Denies cough. SOB  · UA, PCT      History of Present Illness    Chief complaint: syncope  Patient is a 79-year-old male who is Bengali-speaking only with PMH of DM, hypothyroid, HDL, HTN, & blindness who presents to [facility] c/o syncope.  Patient came in today after syncopal event at home. Patient's wife witnessed this. She states that patient was standing up saying that he had to go have a bowel movement where he became dizzy, fatigued and hot and then passed out. Patient's wife lowered him to the ground he did not hit his head or lose consciousness. He was out several seconds but regained consciousness. No seizure-like activity. No tongue biting or loss of urine. No recent fevers, chills, night sweats. He has been taking his medications as prescribed without any changes in his medications. Patient and wife both deny that patient was complaining of any chest pain palpitations, lower extremity edema, tenderness. He had no focal weakness of the bilateral upper extremities or lower extremities. Patient also experienced 1 episode of vomiting in ED while going to CT for CT head/neck. Patient is a poor historian overall. ROS: Review of Systems   Constitutional: Positive for fatigue. HENT: Negative. Eyes: Negative. Respiratory: Negative. Cardiovascular: Negative. Gastrointestinal: Negative. Endocrine: Negative. Genitourinary: Negative. Musculoskeletal: Negative. Skin: Negative. Allergic/Immunologic: Negative. Neurological: Positive for dizziness and syncope. Hematological: Negative. Psychiatric/Behavioral: Negative. Historical Information   Past Medical History:   Diagnosis Date   • Blind    • Diabetes mellitus ([address])    • Disease of thyroid gland    • Hyperlipidemia    • Hypertension      History reviewed. No pertinent surgical history. Social History   Social History     Substance and Sexual Activity   Alcohol Use Not Currently     Social History     Substance and Sexual Activity   Drug Use Not Currently     Social History     Tobacco Use   Smoking Status Never   Smokeless Tobacco Never     Family History: History reviewed. No pertinent family history.     Meds/Allergies   PTA meds:   Prior to Admission Medications Prescriptions Last Dose Informant Patient Reported? Taking?   atorvastatin (LIPITOR) 40 mg tablet   Yes Yes   Sig: Take 40 mg by mouth daily   chlorthalidone 25 mg tablet   Yes Yes   Sig: Take 25 mg by mouth daily   lisinopril (ZESTRIL) 30 mg tablet   Yes Yes   Sig: Take 30 mg by mouth daily   metFORMIN (GLUCOPHAGE) 1000 MG tablet   Yes Yes   Sig: Take 1,000 mg by mouth 2 (two) times a day with meals   metoprolol tartrate (LOPRESSOR) 50 mg tablet   Yes Yes   Sig: Take 50 mg by mouth every 12 (twelve) hours   sitaGLIPtin (JANUVIA) 100 mg tablet   Yes Yes   Sig: Take 100 mg by mouth daily      Facility-Administered Medications: None     No Known Allergies    Objective   First Vitals:   Blood Pressure: 143/83 (10/05/23 1822)  Pulse: 76 (10/05/23 1822)  Temperature: 98.3 °F (36.8 °C) (10/05/23 1823)  Respirations: 16 (10/05/23 1822)  Weight - Scale: 69.4 kg (153 lb) (10/05/23 1822)  SpO2: 99 % (10/05/23 1822)    Current Vitals:   Blood Pressure: 154/93 (10/06/23 0009)  Pulse: 80 (10/05/23 2200)  Temperature: 97.7 °F (36.5 °C) (10/06/23 0009)  Respirations: 16 (10/05/23 2200)  Weight - Scale: 69.4 kg (153 lb) (10/05/23 1822)  SpO2: 98 % (10/05/23 2200)      Intake/Output Summary (Last 24 hours) at 10/6/2023 0020  Last data filed at 10/5/2023 2149  Gross per 24 hour   Intake 2000 ml   Output --   Net 2000 ml       Invasive Devices     Peripheral Intravenous Line  Duration           Peripheral IV 10/05/23 Distal;Left;Upper;Ventral (anterior) Arm <1 day                Physical Exam:  Physical Exam  Vitals reviewed. Constitutional:       Appearance: Normal appearance. He is normal weight. HENT:      Head: Normocephalic and atraumatic. Nose: Nose normal.      Mouth/Throat:      Mouth: Mucous membranes are moist.      Pharynx: Oropharynx is clear. Eyes:      Extraocular Movements: Extraocular movements intact. Pupils: Pupils are equal, round, and reactive to light.    Cardiovascular:      Rate and Rhythm: Normal rate and regular rhythm. Pulses: Normal pulses. Heart sounds: Normal heart sounds. Pulmonary:      Effort: Pulmonary effort is normal.      Breath sounds: Normal breath sounds. Abdominal:      General: Abdomen is flat. Bowel sounds are normal.      Palpations: Abdomen is soft. Musculoskeletal:         General: Normal range of motion. Cervical back: Normal range of motion and neck supple. Skin:     General: Skin is warm and dry. Capillary Refill: Capillary refill takes less than 2 seconds. Neurological:      Mental Status: He is disoriented. Comments: Oriented to person, place   Psychiatric:         Mood and Affect: Mood normal.         Behavior: Behavior normal.         Thought Content:  Thought content normal.         Judgment: Judgment normal.         Lab Results:   Results Reviewed     Procedure Component Value Units Date/Time    HS Troponin I 4hr [[id number]]  (Normal) Collected: 10/05/23 2315    Lab Status: Final result Specimen: Blood from Line, Venous Updated: 10/05/23 2347     hs TnI 4hr 11 ng/L      Delta 4hr hsTnI 8 ng/L     HS Troponin I 2hr [[id number]]  (Normal) Collected: 10/05/23 2101    Lab Status: Final result Specimen: Blood from Line, Venous Updated: 10/05/23 2324     hs TnI 2hr 2 ng/L      Delta 2hr hsTnI -1 ng/L     B-Type Natriuretic Peptide(BNP) [[id number]]  (Normal) Collected: 10/05/23 1901    Lab Status: Final result Specimen: Blood from Arm, Left Updated: 10/05/23 2015     BNP 22 pg/mL     TSH [[id number]]  (Normal) Collected: 10/05/23 1901    Lab Status: Final result Specimen: Blood from Arm, Left Updated: 10/05/23 2000     TSH 3RD GENERATON 2.239 uIU/mL     Comprehensive metabolic panel [[id number]]  (Abnormal) Collected: 10/05/23 1901    Lab Status: Final result Specimen: Blood from Arm, Left Updated: 10/05/23 1950     Sodium 137 mmol/L      Potassium 4.2 mmol/L      Chloride 100 mmol/L      CO2 30 mmol/L      ANION GAP 7 mmol/L      BUN 16 mg/dL Creatinine 1.38 mg/dL      Glucose 194 mg/dL      Calcium 9.6 mg/dL      AST 11 U/L      ALT 9 U/L      Alkaline Phosphatase 73 U/L      Total Protein 8.1 g/dL      Albumin 4.2 g/dL      Total Bilirubin 0.58 mg/dL      eGFR 53 ml/min/1.73sq m     Narrative:      [facility] guidelines for Chronic Kidney Disease (CKD):   •  Stage 1 with normal or high GFR (GFR > 90 mL/min/1.73 square meters)  •  Stage 2 Mild CKD (GFR = 60-89 mL/min/1.73 square meters)  •  Stage 3A Moderate CKD (GFR = 45-59 mL/min/1.73 square meters)  •  Stage 3B Moderate CKD (GFR = 30-44 mL/min/1.73 square meters)  •  Stage 4 Severe CKD (GFR = 15-29 mL/min/1.73 square meters)  •  Stage 5 End Stage CKD (GFR <15 mL/min/1.73 square meters)  Note: GFR calculation is accurate only with a steady state creatinine    Magnesium [[id number]]  (Abnormal) Collected: 10/05/23 1901    Lab Status: Final result Specimen: Blood from Arm, Left Updated: 10/05/23 1950     Magnesium 1.6 mg/dL     HS Troponin 0hr (reflex protocol) [[id number]]  (Normal) Collected: 10/05/23 1901    Lab Status: Final result Specimen: Blood from Arm, Left Updated: 10/05/23 1949     hs TnI 0hr 3 ng/L     Protime-INR [[id number]]  (Abnormal) Collected: 10/05/23 1901    Lab Status: Final result Specimen: Blood from Arm, Left Updated: 10/05/23 1942     Protime 15.0 seconds      INR 1.18    APTT [[id number]]  (Normal) Collected: 10/05/23 1901    Lab Status: Final result Specimen: Blood from Arm, Left Updated: 10/05/23 1942     PTT 28 seconds     CBC and differential [[id number]]  (Abnormal) Collected: 10/05/23 1901    Lab Status: Final result Specimen: Blood from Arm, Left Updated: 10/05/23 1922     WBC 15.65 Thousand/uL      RBC 4.90 Million/uL      Hemoglobin 14.0 g/dL      Hematocrit 44.1 %      MCV 90 fL      MCH 28.6 pg      MCHC 31.7 g/dL      RDW 13.1 %      MPV 12.0 fL      Platelets 822 Thousands/uL      nRBC 0 /100 WBCs      Neutrophils Relative 85 %      Immat GRANS % 0 %      Lymphocytes Relative 9 %      Monocytes Relative 5 %      Eosinophils Relative 1 %      Basophils Relative 0 %      Neutrophils Absolute 13.09 Thousands/µL      Immature Grans Absolute 0.07 Thousand/uL      Lymphocytes Absolute 1.42 Thousands/µL      Monocytes Absolute 0.85 Thousand/µL      Eosinophils Absolute 0.17 Thousand/µL      Basophils Absolute 0.05 Thousands/µL         Imaging:  10/5/23 CTA Head & Neck w/ & w/o contrast  Age-indeterminate lacunar infarct in right heidy - no comparison imaging at time of examination. This may be subacute or chronic in age. Recommend MRI brain without contrast for further evaluation.     Severe chronic microangiopathy.     Negative CTA head and neck for large vessel occlusion, dissection, aneurysm, or high-grade stenosis. Code Status: Full code  POLST: POLST not indicated on this patient. Counseling / Coordination of Care:   45 minutes. Greater than 50% of total time was spent with the patient and/or family counseling and coordinating of care. Patient will be admitted on an Inpatient basis with an anticipated length of stay of > 2 midnights. Justification for Hospital Stay: syncope.

## 2023-10-06 NOTE — ASSESSMENT & PLAN NOTE
· WBC 15.6, ANC 13  · Denies cough or shortness of breath.   · UA and PCT ordered  · If patient develops diarrhea, check stool study

## 2023-10-06 NOTE — ASSESSMENT & PLAN NOTE
· Patient having vomiting and diarrhea  · Lipase 6, LFTS WNL  · procal negative   · CXR pending   · Stool studies pending   · If ongoing vomiting and/or diarrhea will obtain CT A/P   · Due to acute agitation will hold off for now , no diarrhea this AM, did have episode of emesis this AM

## 2023-10-06 NOTE — ASSESSMENT & PLAN NOTE
· Creatinine 1.3, baseline 0.8-0.9, likely prerenal due to GI losses from reported diarrhea on admission   · No diarrhea at all today   · Stool study collection pending - has not been completed due to no ongoing diarrhea   · Received IVF overnight and renal function improved today Cr 1.17   · Hold chlorthalidone and lisinopril today   · Can resume home BP meds tomorrow with follow up BMP and BP monitoring in 5-7 days with PCP   · Avoid NSAIDs, nephrotoxins and hypotension

## 2023-10-06 NOTE — PLAN OF CARE
Problem: Potential for Falls  Goal: Patient will remain free of falls  Description: INTERVENTIONS:  - Educate patient/family on patient safety including physical limitations  - Instruct patient to call for assistance with activity   - Consult OT/PT to assist with strengthening/mobility   - Keep Call bell within reach  - Keep bed low and locked with side rails adjusted as appropriate  - Keep care items and personal belongings within reach  - Initiate and maintain comfort rounds  - Make Fall Risk Sign visible to staff  - Offer Toileting every  Hours, in advance of need  - Initiate/Maintain alarm  - Obtain necessary fall risk management equipment:   - Apply yellow socks and bracelet for high fall risk patients  - Consider moving patient to room near nurses station  Outcome: Progressing     Problem: PAIN - ADULT  Goal: Verbalizes/displays adequate comfort level or baseline comfort level  Description: Interventions:  - Encourage patient to monitor pain and request assistance  - Assess pain using appropriate pain scale  - Administer analgesics based on type and severity of pain and evaluate response  - Implement non-pharmacological measures as appropriate and evaluate response  - Consider cultural and social influences on pain and pain management  - Notify physician/advanced practitioner if interventions unsuccessful or patient reports new pain  Outcome: Progressing     Problem: INFECTION - ADULT  Goal: Absence or prevention of progression during hospitalization  Description: INTERVENTIONS:  - Assess and monitor for signs and symptoms of infection  - Monitor lab/diagnostic results  - Monitor all insertion sites, i.e. indwelling lines, tubes, and drains  - Monitor endotracheal if appropriate and nasal secretions for changes in amount and color  - [place] appropriate cooling/warming therapies per order  - Administer medications as ordered  - Instruct and encourage patient and family to use good hand hygiene technique  - Identify and instruct in appropriate isolation precautions for identified infection/condition  Outcome: Progressing     Problem: SAFETY ADULT  Goal: Patient will remain free of falls  Description: INTERVENTIONS:  - Educate patient/family on patient safety including physical limitations  - Instruct patient to call for assistance with activity   - Consult OT/PT to assist with strengthening/mobility   - Keep Call bell within reach  - Keep bed low and locked with side rails adjusted as appropriate  - Keep care items and personal belongings within reach  - Initiate and maintain comfort rounds  - Make Fall Risk Sign visible to staff  - Offer Toileting every  Hours, in advance of need  - Initiate/Maintain alarm  - Obtain necessary fall risk management equipment:   - Apply yellow socks and bracelet for high fall risk patients  - Consider moving patient to room near nurses station  Outcome: Progressing  Goal: Maintain or return to baseline ADL function  Description: INTERVENTIONS:  -  Assess patient's ability to carry out ADLs; assess patient's baseline for ADL function and identify physical deficits which impact ability to perform ADLs (bathing, care of mouth/teeth, toileting, grooming, dressing, etc.)  - Assess/evaluate cause of self-care deficits   - Assess range of motion  - Assess patient's mobility; develop plan if impaired  - Assess patient's need for assistive devices and provide as appropriate  - Encourage maximum independence but intervene and supervise when necessary  - Involve family in performance of ADLs  - Assess for home care needs following discharge   - Consider OT consult to assist with ADL evaluation and planning for discharge  - Provide patient education as appropriate  Outcome: Progressing  Goal: Maintains/Returns to pre admission functional level  Description: INTERVENTIONS:  - Perform BMAT or MOVE assessment daily.   - Set and communicate daily mobility goal to care team and patient/family/caregiver. - Collaborate with rehabilitation services on mobility goals if consulted  - Perform Range of Motion  times a day. - Reposition patient every  hours. - Dangle patient  times a day  - Stand patient  times a day  - Ambulate patient  times a day  - Out of bed to chair  times a day   - Out of bed for meals  times a day  - Out of bed for toileting  - Record patient progress and toleration of activity level   Outcome: Progressing     Problem: DISCHARGE PLANNING  Goal: Discharge to home or other facility with appropriate resources  Description: INTERVENTIONS:  - Identify barriers to discharge w/patient and caregiver  - Arrange for needed discharge resources and transportation as appropriate  - Identify discharge learning needs (meds, wound care, etc.)  - Arrange for interpretive services to assist at discharge as needed  - Refer to Case Management Department for coordinating discharge planning if the patient needs post-hospital services based on physician/advanced practitioner order or complex needs related to functional status, cognitive ability, or social support system  Outcome: Progressing     Problem: Knowledge Deficit  Goal: Patient/family/caregiver demonstrates understanding of disease process, treatment plan, medications, and discharge instructions  Description: Complete learning assessment and assess knowledge base.   Interventions:  - Provide teaching at level of understanding  - Provide teaching via preferred learning methods  Outcome: Progressing     Problem: METABOLIC, FLUID AND ELECTROLYTES - ADULT  Goal: Electrolytes maintained within normal limits  Description: INTERVENTIONS:  - Monitor labs and assess patient for signs and symptoms of electrolyte imbalances  - Administer electrolyte replacement as ordered  - Monitor response to electrolyte replacements, including repeat lab results as appropriate  - Instruct patient on fluid and nutrition as appropriate  Outcome: Progressing  Goal: Fluid balance maintained  Description: INTERVENTIONS:  - Monitor labs   - Monitor I/O and WT  - Instruct patient on fluid and nutrition as appropriate  - Assess for signs & symptoms of volume excess or deficit  Outcome: Progressing     Problem: MUSCULOSKELETAL - ADULT  Goal: Maintain or return mobility to safest level of function  Description: INTERVENTIONS:  - Assess patient's ability to carry out ADLs; assess patient's baseline for ADL function and identify physical deficits which impact ability to perform ADLs (bathing, care of mouth/teeth, toileting, grooming, dressing, etc.)  - Assess/evaluate cause of self-care deficits   - Assess range of motion  - Assess patient's mobility  - Assess patient's need for assistive devices and provide as appropriate  - Encourage maximum independence but intervene and supervise when necessary  - Involve family in performance of ADLs  - Assess for home care needs following discharge   - Consider OT consult to assist with ADL evaluation and planning for discharge  - Provide patient education as appropriate  Outcome: Progressing

## 2023-10-06 NOTE — PROGRESS NOTES
[address] [facility]  Progress Note  Name: Stacey Nettles  MRN: [id number]  Unit/Bed#: E5 -01 I Date of Admission: 10/5/2023   Date of Service: 10/6/2023 I Hospital Day: 0      Assessment/Plan   * Syncope and collapse  Assessment & Plan  Family reports patient was walking to the bathroom and complained of feeling dizzy and hot then had a syncopal event. assisted him to the ground. While laying on the ground he felt like he was going to pass out again. Denied seizure-like activity.  reported diarrhea and emesis   · CTA head/neck on arrival: Age-indeterminate lacunar infarct in the right heidy, may be subacute or chronic in age, severe chronic microangiopathic, negative CTA head and neck for large vessel occlusion dissection or aneurysm or high-grade stenosis  · Neurology consulted  · Hold lisinopril and chlorthalidone to avoid hypotension, hold parameters on Lopressor  · He is already on statin with Lipitor 40 mg daily, check lipid panel and A1c  · Start aspirin 81 mg daily  · Will discuss if MRI brain indicated with neurology   · He is acutely agitated and combative this morning currently into soft wrist restraints  · Spoke with family member his niece who states he is normally alert, usually knows where he is but never knows the month or year, states is not really cooperative at home either  · Geriatrics consulted  · Will order oral Seroquel 12.5 mg nightly  · Continue to reorient, delirium precautions  · Family members to come in this morning  · [facility] 8/2022 showing cerebral atrophy with chronic ischemic changes  · Continue work-up for infectious etiology with CXR and stool studies   · Telemetry unremarkable overnight   · Check orthostatic VS once mentation improves and able to do so   · Neurochecks  · Updated niece over the phone who is point of contact   · Per outpatient records requires assistance with all ADLs, uses adult diapers at baseline  · Appears he moved here from [facility] earlier this year       Legally blind  Assessment & Plan  · Per outpt records legally blind     Vomiting and diarrhea  Assessment & Plan  · Patient having vomiting and diarrhea  · Lipase 6, LFTS WNL  · procal negative   · CXR pending   · Stool studies pending   · If ongoing vomiting and/or diarrhea will obtain CT A/P   · Due to acute agitation will hold off for now , no diarrhea this AM, did have episode of emesis this AM       Type 2 diabetes mellitus with hyperglycemia, without long-term current use of insulin ([address])  Assessment & Plan  No results found for: "HGBA1C"    Recent Labs     10/06/23  0122 10/06/23  0730   POCGLU 155* 172*       Blood Sugar Average: Last 72 hrs:  (P) 163.5   Check A1c , none on file   A1c 7.5% in January per care everywhere   SSI, accuchecks   Hold metformin and januvia     Acute kidney injury ([address])  Assessment & Plan  · Creatinine 1.3, baseline 0.8-0.9, likely prerenal due to GI losses from reported diarrhea on admission   · No diarrhea this AM   · Stool study collection pending   · Received IVF overnight and renal function improved today Cr 1.17   · Hold further IVF and encourage po intake   · Hold chlorthalidone and lisinopril  · Monitor intake and output/urinary retention protocol  · Avoid NSAIDs, nephrotoxins and hypotension  · Monitor BMP    Hypomagnesemia  Assessment & Plan  · Mg 1.6, on arrival repleted    Neutrophilic leukocytosis  Assessment & Plan  · WBC 15.6, ANC 13 --> 11  · Leukocytosis is trending down  · UA without acute infection  · Stool studies are pending  · Patient did vomit again this morning  · Due to significant agitation we will hold off on CT abdomen pelvis at this time  · Continue serial abdominal exams  · Monitor stool output, no diarrhea this morning  · Monitor for any episodes of vomiting  · Check CXR this morning   · Speech consulted  · Pro-Enrique negative x1  · Monitor off abx for now       Intellectual disability  Assessment & Plan  · Documented history of learning/Intellectual disability since youth    Essential (primary) hypertension  Assessment & Plan  · Home regimen: Chlorthalidone, lisinopril and metoprolol  · Continue Lopressor 50 mg every 12 hours with hold parameters  · Hold lisinopril and chlorthalidone    Mixed hyperlipidemia  Assessment & Plan  · Continue statin with Lipitor 40 mg daily per home regimen  · Check updated lipid panel    Other specified hypothyroidism  Assessment & Plan  · Levothyroxine 75 mcg daily            VTE Pharmacologic Prophylaxis: VTE Score: 2 Moderate Risk (Score 3-4) - Pharmacological DVT Prophylaxis Ordered: enoxaparin (Lovenox). Patient Centered Rounds: I performed bedside rounds with nursing staff today. Discussions with Specialists or Other Care Team Provider: neurology     Education and Discussions with Family / Patient: Updated  (niece ) via phone. Total Time Spent on Date of Encounter in care of patient:  mins. This time was spent on one or more of the following: performing physical exam; counseling and coordination of care; obtaining or reviewing history; documenting in the medical record; reviewing/ordering tests, medications or procedures; communicating with other healthcare professionals and discussing with patient's family/caregivers. Current Length of Stay: 0 day(s)  Current Patient Status: Observation   Certification Statement: The patient will continue to require additional inpatient hospital stay due to Syncope and collapse  Discharge Plan: Anticipate discharge in 24-48 hrs to discharge location to be determined pending rehab evaluations. Code Status: Level 1 - Full Code    Subjective:   Patient is agitated and combative this morning. Per nursing staff was kicking his feet. He is into soft wrist restraints currently. Unable to use Micronesian interpretation. He is Micronesian-speaking only. I spoke with niece over the phone who is his point of contact.   He is normally alert and knows where he is but does not know the month and year at baseline and is not cooperative at home either. He moved here from [place] this year. Patient is moving all extremities spontaneously and pulling IV and gown. He is unable to follow commands at this time. Family is coming in later to see him    Objective:     Vitals:   Temp (24hrs), Av.3 °F (36.8 °C), Min:97.7 °F (36.5 °C), Max:99 °F (37.2 °C)    Temp:  [97.7 °F (36.5 °C)-99 °F (37.2 °C)] 99 °F (37.2 °C)  HR:  [76-91] 91  Resp:  [16] 16  BP: (123-154)/(68-93) 123/68  SpO2:  [95 %-100 %] 95 %  Body mass index is 24.48 kg/m². Input and Output Summary (last 24 hours): Intake/Output Summary (Last 24 hours) at 10/6/2023 0855  Last data filed at 10/5/2023 2149  Gross per 24 hour   Intake 2000 ml   Output --   Net 2000 ml       Physical Exam:   Physical Exam  Vitals and nursing note reviewed. Chaperone present: RN at bedside    Eyes:      Comments: Legally blind    Cardiovascular:      Rate and Rhythm: Normal rate and regular rhythm. Pulmonary:      Effort: Pulmonary effort is normal. No respiratory distress. Breath sounds: Normal breath sounds. Comments: Room air   Abdominal:      Palpations: Abdomen is soft. Tenderness: There is no abdominal tenderness. Musculoskeletal:      Right lower leg: No edema. Left lower leg: No edema. Skin:     Comments: Two soft wrist restaints in place   Neurological:      Mental Status: He is alert. Comments: Difficult to assess given agitaiton, unable to use Paraguayan interpretation, moving all extremities spontaneously, pulling at gown, does not follow commands    Psychiatric:         Behavior: Behavior is uncooperative and agitated. Cognition and Memory: Cognition is impaired. Memory is impaired.           Additional Data:     Labs:  Results from last 7 days   Lab Units 10/06/23  0432   WBC Thousand/uL 13.37*   HEMOGLOBIN g/dL 13.1   HEMATOCRIT % 40.6   PLATELETS Thousands/uL 255   NEUTROS PCT % 85*   LYMPHS PCT % 6*   MONOS PCT % 9   EOS PCT % 0     Results from last 7 days   Lab Units 10/06/23  0425   SODIUM mmol/L 137   POTASSIUM mmol/L 3.7   CHLORIDE mmol/L 107   CO2 mmol/L 20*   BUN mg/dL 18   CREATININE mg/dL 1.17   ANION GAP mmol/L 10   CALCIUM mg/dL 8.6   ALBUMIN g/dL 3.7   TOTAL BILIRUBIN mg/dL 0.48   ALK PHOS U/L 61   ALT U/L 7   AST U/L 10*   GLUCOSE RANDOM mg/dL 160*     Results from last 7 days   Lab Units 10/05/23  1901   INR  1.18     Results from last 7 days   Lab Units 10/06/23  0730 10/06/23  0122   POC GLUCOSE mg/dl 172* 155*         Results from last 7 days   Lab Units 10/06/23  0432   PROCALCITONIN ng/ml 0.21       Lines/Drains:  Invasive Devices     Peripheral Intravenous Line  Duration           Peripheral IV 10/06/23 Right;Ventral (anterior) Forearm <1 day                  Telemetry:  Telemetry Orders (From admission, onward)             24 Hour Telemetry Monitoring  Continuous x 24 Hours (Telem)        Question:  Reason for 24 Hour Telemetry  Answer:  Syncope suspected to be cardiac in origin                 Telemetry Reviewed: Sinus Tachycardia  Indication for Continued Telemetry Use: Syncope             Imaging: Reviewed radiology reports from this admission including: CT head  Await CXR     Recent Cultures (last 7 days):         Last 24 Hours Medication List:   Current Facility-Administered Medications   Medication Dose Route Frequency Provider Last Rate   • acetaminophen  650 mg Oral Q6H PRN Uljaclyness Sherita, CRNP     • aluminum-magnesium hydroxide-simethicone  30 mL Oral Q6H PRN Ulangelica Magallon CRNP     • aspirin  81 mg Oral Daily Chelly Maddox PA-C     • atorvastatin  40 mg Oral Daily With DINO Caraballo     • insulin lispro  1-5 Units Subcutaneous 4x Daily (AC & HS) Ulangelica Magallon CRNP     • levothyroxine  75 mcg Oral Early Morning UlHERLINDA FariaNP     • metoprolol tartrate  50 mg Oral Q12H [address], CRNP     • ondansetron 4 mg Intravenous Q6H PRN DINO Bowser     • QUEtiapine  12.5 mg Oral HS Chelly Maddox PA-C     • simethicone  80 mg Oral 4x Daily PRN DINO oBwser          Today, Patient Was Seen By: Corrine Bryant PA-C    **Please Note: This note may have been constructed using a voice recognition system. **

## 2023-10-06 NOTE — ASSESSMENT & PLAN NOTE
Family reports patient was walking to the bathroom and complained of feeling dizzy and hot then had a syncopal event. assisted him to the ground. While laying on the ground he felt like he was going to pass out again. Denied seizure-like activity.  reported diarrhea and emesis   · CTA head/neck on arrival: Age-indeterminate lacunar infarct in the right heidy, may be subacute or chronic in age, severe chronic microangiopathic, negative CTA head and neck for large vessel occlusion dissection or aneurysm or high-grade stenosis  · Neurology consulted  · Hold lisinopril and chlorthalidone to avoid hypotension, hold parameters on Lopressor  · He is already on statin with Lipitor 40 mg daily, check lipid panel and A1c  · Start aspirin 81 mg daily  · Will discuss if MRI brain indicated with neurology   · He is acutely agitated and combative this morning currently into soft wrist restraints  · Spoke with family member his niece who states he is normally alert, usually knows where he is but never knows the month or year, states is not really cooperative at home either  · Geriatrics consulted  · Will order oral Seroquel 12.5 mg nightly  · Continue to reorient, delirium precautions  · Family members to come in this morning  · [address] 8/2022 showing cerebral atrophy with chronic ischemic changes  · Continue work-up for infectious etiology with CXR and stool studies   · Telemetry unremarkable overnight   · Check orthostatic VS once mentation improves and able to do so   · Neurochecks

## 2023-10-06 NOTE — NURSING NOTE
Patient and niece given discharge instructions and prescription is to be picked up at pharmacy. Patient left with all belongings.

## 2023-10-06 NOTE — ASSESSMENT & PLAN NOTE
No results found for: "HGBA1C"  · Hold metformin and Januvia.   Add sliding scale insulin and ADA diet  Recent Labs     10/06/23  0122   POCGLU 155*       Blood Sugar Average: Last 72 hrs:  (P) 155

## 2023-10-06 NOTE — ASSESSMENT & PLAN NOTE
No results found for: "HGBA1C"    Recent Labs     10/06/23  0122 10/06/23  0730 10/06/23  1135   POCGLU 155* 172* 172*       Blood Sugar Average: Last 72 hrs:  (P) [id number]   Follow-up A1c with PCP outpatient, continue metformin and [facility]

## 2023-10-06 NOTE — ASSESSMENT & PLAN NOTE
· WBC 15.6, ANC 13 --> 11  · Leukocytosis is trending down  · UA without acute infection  · No further diarrhea, no abdominal pain   · Now tolerating diet   · Pro-Enrique negative x1  · No indication for antibiotics at this time   · recommend CBC in 1 week   · Return to ER with any fevers

## 2023-10-06 NOTE — PHYSICAL THERAPY NOTE
PT EVALUATION    58 y.o.    [id number]    Dehydration [E86.0]  Dizziness [R42]  Syncope [R55]  DIEGO (acute kidney injury) ([address]) [N17.9]  Abnormal computed tomography angiography (CTA) [R93.89]    Past Medical History:   Diagnosis Date    Blind     Diabetes mellitus ([address])     Disease of thyroid gland     Hyperlipidemia     Hypertension          History reviewed. No pertinent surgical history. 10/06/23 0953   PT Last Visit   PT Visit Date 10/06/23   Note Type   Note type Evaluation   Pain Assessment   Pain Assessment Tool 0-10   Pain Score No Pain   Restrictions/Precautions   Weight Bearing Precautions Per Order No   Other Precautions Contact/isolation;Cognitive; Chair Alarm; Bed Alarm; Fall Risk;Visual impairment  (Language Barrier; Blind)   Home Living   Type of [address] [place] [address] Two level;Stairs to enter with rails; Able to live on main level with bedroom/bathroom; Performs ADLs on one level   Prior Function   Level of [place] Needs assistance with ADLs; Needs assistance with functional mobility; Needs assistance with IADLS   Lives With Family  (Niece)   Receives Help From Family;Personal care attendant  (HHA 10 hours/day x 7 days)   IADLs Family/Friend/Other provides transportation; Family/Friend/Other provides meals; Family/Friend/Other provides medication management   Falls in the last 6 months 0   General   Additional Pertinent History CT: age-indeterminate lacunar infarct   Family/Caregiver Present Yes   Cognition   Overall Cognitive Status Impaired   Arousal/Participation Responsive   Memory Unable to assess   Following Commands Follows one step commands with increased time or repetition  (via translation by family)   Subjective   Subjective Pt cooperative. Bed Mobility   Supine to Sit 4  Minimal assistance   Additional items Assist x 1; Increased time required;Verbal cues   Transfers   Sit to Stand 4  Minimal assistance   Additional items Assist x 1; Increased time required;Verbal cues   Stand to Sit 4  Minimal assistance   Additional items Assist x 1; Increased time required;Verbal cues   Stand pivot 4  Minimal assistance   Additional items Assist x 1; Increased time required;Verbal cues   Ambulation/Elevation   Gait pattern Forward Flexion;Decreased foot clearance;Shuffling; Short stride; Excessively slow   Gait Assistance 4  Minimal assist   Additional items Assist x 1;Assist x 2; Tactile cues; Verbal cues   Assistive Device None   Distance 20' x 2   Balance   Static Sitting Fair +   Dynamic Sitting Fair   Static Standing Fair   Dynamic Standing Fair -   Ambulatory Poor +   Endurance Deficit   Endurance Deficit Yes   Activity Tolerance   Activity Tolerance Patient tolerated treatment well   Medical Staff Made Aware BHAVESH Deleon; Pt seen for Co-treatment with Occupational Therapist due to pt's medical complexity, functional limitations and limited activity tolerance. Nurse Made Aware yes   Assessment   Assessment Pt is an 58 y.o. male admitted to [address] [place] [address] on 10/5/23 s/p syncopal episode. PT consulted with eval and treat and activity as tolerated orders. Pt lives with jenn in a [facility], 1st floor set-up. At baseline, pt gets assist with all mobility and ADLs (due to blindness). Upon evaluation, pt required min A for all mobility. The patient's AM-PAC Basic Mobility Inpatient Short Form Raw Score is 18. A Raw score of greater than 16 suggests the patient may benefit from discharge to home. Please also refer to the recommendation of the Physical Therapist for safe discharge planning. No needs for PT services identified at this time.    Barriers to Discharge None   Recommendation   PT Discharge Recommendation No rehabilitation needs  (Pt is at baseline function per family.)   AM-PAC Basic Mobility Inpatient   Turning in Flat Bed Without Bedrails 3   Lying on Back to Sitting on Edge of Flat Bed Without Bedrails 3   Moving Bed to Chair 3   Standing Up From Chair Using Arms 3   Walk in Room 3   Climb 3-5 Stairs With Railing 3   Basic Mobility Inpatient Raw Score 18   Basic Mobility Standardized Score 41.05   Highest Level Of Mobility   JH-HLM Goal 6: Walk 10 steps or more   JH-HLM Achieved 6: Walk 10 steps or more   End of Consult   Patient Position at End of Consult Bedside chair;Bed/Chair alarm activated; All needs within reach     Hx/personal factors: STEPHIE home environment, steps within home environment, difficulty performing ADLs, difficulty performing IADLs, difficulty performing transfers/mobility, fall risk , language barrier, and level of education, comorbidities    Examination:decreased strength , decreased balance, gait deviations, decreased safety awareness, and impaired posture. Clinical: unpredictable Ongoing medical status and bed/chair alarm.      Complexity: high         Forrestine Don Sales, PT

## 2023-10-06 NOTE — CONSULTS
Consultation - Neurology   Elizabeth Stewart 58 y.o. male MRN: [id number]  Unit/Bed#: E5 -01 Encounter: [id number]    Assessment/Plan    * Syncope and collapse  Assessment & Plan  58year old with history of diabetes, hyperlipidemia and hypertension. Presents to [address] [place] [address] on 10/5/2023, with chief complaint of feeling ill with dizziness, nausea and diarrhea. The family brought him in due to a syncopal episode while at home in the bathroom. Per record review, the patient was standing up saying that he did have a bowel movement when became dizzy, fatigued and hot and passed out. CTA of head and neck was completed - Age-indeterminate lacunar infarct in right heidy - no comparison imaging at time of examination. This may be subacute or chronic in age. Recommend MRI brain without contrast for further evaluation. Severe chronic microangiopathy. Negative CTA head and neck for large vessel occlusion, dissection, aneurysm, or high-grade stenosis. Syncope/collapse -the patient was reported to be feeling dizzy hot prior, as if he was going to pass out, he was having nausea/vomiting and diarrhea yesterday as well. He was found to have a DIEGO and was given IV fluids. It is suspected he may have a viral gastroenteritis. There was no reported seizure activity or lateralizing features to this event. This does not appear to be epileptic in nature does not appear to be in the reference of acute ischemic event. CTA with evidence of age-indeterminate lacunar infarct in the right heidy, suspect this is chronic as he did not have any lateralizing features on description or examination. As well as imaging appears to be chronic . • No need for MRI at this time as this looks chronic, with out lateralizing feature by description and limited examination  • Echo recommend as the patient also had syncope  • Aspirin 81 mg, would add with evidence of stroke on imaging.    • Atorvastatin 40 mg (home medication, continue)  • Frequent neuro checks. Continue to monitor and notify neurology with any changes. • Check orthostatic blood pressures  • Continue to treat underlying metabolic and infectious derangements, per medicine team - suspect viral gastroenteritis, DIEGO, hypomagnesemia,    - Medical management and supportive care per primary team. Correction of any metabolic or infectious disturbances. - Examined alongside attending    Follow up with vascular neurology as out patient in 4-6 weeks with AP, no need for further neurological testing at this time. History of Present Illness   Reason for Consult / Principal Problem:     HPI: Luisana Parker is a 58 y.o.male with history of diabetes, hyperlipidemia and hypertension. Presents to [address] [place] [address] on 10/5/2023, with chief complaint of feeling ill with dizziness, nausea and diarrhea. The family brought him in due to a syncopal episode while at home in the bathroom. Per record review the patient was standing up saying that he did have a bowel movement when became dizzy, fatigued and hot and passed out. Patient's wife lowered him to the ground he did not hit his head or lose consciousness. It was reported the patient was out for several seconds but regained consciousness with no seizure-like activity no tongue bite or loss of urine. There is no focality described on examination, during this event or after. There is reported history of intellectual disability. CTA of head and neck was completed - Age-indeterminate lacunar infarct in right heidy - no comparison imaging at time of examination. This may be subacute or chronic in age. Recommend MRI brain without contrast for further evaluation. Severe chronic microangiopathy. Negative CTA head and neck for large vessel occlusion, dissection, aneurysm, or high-grade stenosis. Vitals in the ED pulse was 76 patient was afebrile blood pressure is 143/83.     Labs completed  BNP 22  TSH 2.239  CMP did show an elevated creatinine of 1.38H, AST of 11, glucose of 194H  Magnesium 1.6LL  INR wnl  CBC with a leukocytosis of 15.65H    In the ED the patients was give IV fluid bolus, mg, meclizine and zofran. The patient was seen by the medicine team it was felt that his syncope, dizziness was related to dehydration and his nausea vomiting and diarrhea yesterday. It was felt the patient may have viral gastroenteritis. The patient was noted to have acute kidney injury as well as hypomagnesia him he also did have a leukocytosis. Neurology was a consulted secondary to syncopal event, as well as the age indeterminate lacunar infarct in the right heidy. The patient was agitated this a.m. Swiss speaking, the patient got Zyprexa prior to this episode and his encephalopathic. Unable to provide hx. From family at bedside, the patient was feeling tired and weak, as well as lightheaded, he had to be lowered to the ground and did have a loc episode. There was no seizure activity or one sided weakness. They are not aware of hx of stroke or deficits from a stroke. The patient cannot provide a hx. Inpatient consult to Neurology  Consult performed by: Teodoro De Luna PA-C  Consult ordered by: DINO Aiken        Review of Systems  12 point ROS as per HPI, otherwise negative. Historical Information   Past Medical History:   Diagnosis Date   • Blind    • Diabetes mellitus ([address])    • Disease of thyroid gland    • Hyperlipidemia    • Hypertension      History reviewed. No pertinent surgical history.   Social History   Social History     Substance and Sexual Activity   Alcohol Use Not Currently     Social History     Substance and Sexual Activity   Drug Use Not Currently     E-Cigarette/Vaping   • E-Cigarette Use Never User      E-Cigarette/Vaping Substances     Social History     Tobacco Use   Smoking Status Never   Smokeless Tobacco Never     Family History: History reviewed. No pertinent family history. Review of previous medical records was completed, please see HPI. Meds/Allergies   all current active meds have been reviewed, current meds:   Current Facility-Administered Medications   Medication Dose Route Frequency   • acetaminophen (TYLENOL) tablet 650 mg  650 mg Oral Q6H PRN   • aluminum-magnesium hydroxide-simethicone (MAALOX) oral suspension 30 mL  30 mL Oral Q6H PRN   • aspirin (ECOTRIN LOW STRENGTH) EC tablet 81 mg  81 mg Oral Daily   • atorvastatin (LIPITOR) tablet 40 mg  40 mg Oral Daily With Dinner   • enoxaparin (LOVENOX) subcutaneous injection 40 mg  40 mg Subcutaneous Daily   • insulin lispro (HumaLOG) 100 units/mL subcutaneous injection 1-5 Units  1-5 Units Subcutaneous 4x Daily (AC & HS)   • levothyroxine tablet 75 mcg  75 mcg Oral Early Morning   • metoprolol tartrate (LOPRESSOR) tablet 50 mg  50 mg Oral Q12H NUHA   • OLANZapine (ZyPREXA) IM injection 2.5 mg  2.5 mg Intramuscular Q8H PRN    Or   • OLANZapine (ZyPREXA) tablet 2.5 mg  2.5 mg Oral Q8H PRN   • ondansetron (ZOFRAN) injection 4 mg  4 mg Intravenous Q6H PRN   • QUEtiapine (SEROquel) tablet 12.5 mg  12.5 mg Oral HS   • simethicone (MYLICON) chewable tablet 80 mg  80 mg Oral 4x Daily PRN    and PTA meds:   Prior to Admission Medications   Prescriptions Last Dose Informant Patient Reported?  Taking?   atorvastatin (LIPITOR) 40 mg tablet   Yes Yes   Sig: Take 40 mg by mouth daily   chlorthalidone 25 mg tablet   Yes Yes   Sig: Take 25 mg by mouth daily   docusate sodium (COLACE) 100 mg capsule   Yes Yes   Sig: Take 100 mg by mouth 2 (two) times a day   levothyroxine 75 mcg tablet   Yes Yes   Sig: Take 75 mcg by mouth daily in the early morning   lisinopril (ZESTRIL) 30 mg tablet   Yes Yes   Sig: Take 30 mg by mouth daily   metFORMIN (GLUCOPHAGE) 1000 MG tablet   Yes Yes   Sig: Take 1,000 mg by mouth 2 (two) times a day with meals   metoprolol tartrate (LOPRESSOR) 50 mg tablet   Yes Yes   Sig: Take 50 mg by mouth every 12 (twelve) hours   sitaGLIPtin (JANUVIA) 100 mg tablet   Yes Yes   Sig: Take 100 mg by mouth daily      Facility-Administered Medications: None       No Known Allergies    Objective   Vitals:Blood pressure (!) 163/104, pulse 91, temperature 99 °F (37.2 °C), temperature source Oral, resp. rate 16, height 5' 6" (1.676 m), weight 68.8 kg (151 lb 10.8 oz), SpO2 95 %. ,Body mass index is 24.48 kg/m². Intake/Output Summary (Last 24 hours) at 10/6/2023 1216  Last data filed at 10/5/2023 2149  Gross per 24 hour   Intake 2000 ml   Output --   Net 2000 ml       Invasive Devices: Invasive Devices     Peripheral Intravenous Line  Duration           Peripheral IV 10/06/23 Right;Ventral (anterior) Forearm <1 day              Vital signs reviewed  Constitutional - the patient is ill appearing, encephalopathic, no nuchal rigidity noted. HEENT - NC/AT  Cardiac - rate regular  Lungs - no respiratory distress noted. Abdomen - non distended  Extremities - no edema noted  Skin - no rashes noted    Neurological  Mental status - the patient is encephalopathic and not following commands, not tracking to examiner, eye lid resistance noted. The patient was given Zyprexa, prior to arrival.   Cranial nerves 2 through 12 pupils unable to visualize, eye lid resistance noted, no facial droop noted, no ptosis, face appears symmetric  Motor - Observed moving all extremities equally, non focal  No tremor observed  Sensation - grimaced and withdrew x 4. Coordination -unable  Toes are equivical bilaterally  No evidence of seizure activity, observed clinically  (limited examination to patient cooperation)    Lab Results:   I have personally reviewed pertinent reports.   , CBC:   Results from last 7 days   Lab Units 10/06/23  0432 10/05/23  1901   WBC Thousand/uL 13.37* 15.65*   RBC Million/uL 4.54 4.90   HEMOGLOBIN g/dL 13.1 14.0   HEMATOCRIT % 40.6 44.1   MCV fL 89 90   PLATELETS Thousands/uL 255 286   , BMP/CMP: Results from last 7 days   Lab Units 10/06/23  0425 10/05/23  1901   SODIUM mmol/L 137 137   POTASSIUM mmol/L 3.7 4.2   CHLORIDE mmol/L 107 100   CO2 mmol/L 20* 30   BUN mg/dL 18 16   CREATININE mg/dL 1.17 1.38*   CALCIUM mg/dL 8.6 9.6   AST U/L 10* 11*   ALT U/L 7 9   ALK PHOS U/L 61 73   EGFR ml/min/1.73sq m 65 53   , Vitamin B12:   , HgBA1C:   , TSH:   Results from last 7 days   Lab Units 10/05/23  1901   TSH 3RD GENERATON uIU/mL 2.239   , Coagulation:   Results from last 7 days   Lab Units 10/05/23  1901   INR  1.18   , Lipid Profile:   , Ammonia:   , Urinalysis:   Results from last 7 days   Lab Units 10/06/23  0614   COLOR UA  Light Yellow   CLARITY UA  Clear   SPEC GRAV UA  >=1.050*   PH UA  5.5   LEUKOCYTES UA  Small*   NITRITE UA  Negative   GLUCOSE UA mg/dl Negative   KETONES UA mg/dl Negative   BILIRUBIN UA  Negative   BLOOD UA  Negative   , Drug Screen:   , Medication Drug Levels:       Invalid input(s): "CARBAMAZEPINE", "LACOSAMIDE", "OXCARBAZEPINE"     Procedure: CTA head and neck with and without contrast    Result Date: 10/5/2023  Narrative: CTA NECK AND BRAIN WITH AND WITHOUT CONTRAST INDICATION: Syncope, recurrent dizzy/syncope COMPARISON:   None. TECHNIQUE:  Routine CT imaging of the Brain without contrast.  Post contrast imaging was performed after administration of iodinated contrast through the neck and brain. Post contrast axial 0.625 mm images timed to opacify the arterial system. 3D rendering was performed on an independent workstation. MIP reconstructions performed. Coronal reconstructions were performed of the noncontrast portion of the brain. Radiation dose length product (DLP) for this visit:  1182 mGy-cm . This examination, like all CT scans performed in the [facility], was performed utilizing techniques to minimize radiation dose exposure, including the use of iterative reconstruction and automated exposure control.  IV Contrast:  85 mL of iohexol (OMNIPAQUE) IMAGE QUALITY:   Diagnostic FINDINGS: NONCONTRAST BRAIN PARENCHYMA: Age-indeterminate lacunar infarct in right heidy. Decreased attenuation is noted in periventricular and subcortical white matter demonstrating an appearance that is statistically most likely to represent advanced microangiopathic change. No intracranial mass, mass effect or midline shift. No acute parenchymal hemorrhage. VENTRICLES AND EXTRA-AXIAL SPACES:  Normal for the patient's age. VISUALIZED ORBITS: Normal visualized orbits. PARANASAL SINUSES: Mild mucosal thickening of the visualized paranasal sinuses. CERVICAL VASCULATURE AORTIC ARCH AND GREAT VESSELS:  Normal aortic arch and great vessel origins. Normal visualized subclavian vessels. RIGHT VERTEBRAL ARTERY CERVICAL SEGMENT:  Normal origin. The vessel is normal in caliber throughout the neck. LEFT VERTEBRAL ARTERY CERVICAL SEGMENT:  Normal origin. The vessel is normal in caliber throughout the neck. RIGHT EXTRACRANIAL CAROTID SEGMENT:  Normal caliber common carotid artery. Normal bifurcation and cervical internal carotid artery. No stenosis or dissection. Retropharyngeal course of right ICA, normal variant. LEFT EXTRACRANIAL CAROTID SEGMENT:  Normal caliber common carotid artery. Normal bifurcation and cervical internal carotid artery. No stenosis or dissection. Retropharyngeal course of left ICA, normal variant. NASCET criteria was used to determine the degree of internal carotid artery diameter stenosis. INTRACRANIAL VASCULATURE INTERNAL CAROTID ARTERIES:  Normal enhancement of the intracranial portions of the internal carotid arteries with mild calcified atherosclerotic disease in right ICA supraclinoid segment. Normal ophthalmic artery origins. Normal ICA terminus. ANTERIOR CIRCULATION:  Symmetric A1 segments and anterior cerebral arteries with normal enhancement. Normal anterior communicating artery.  MIDDLE CEREBRAL ARTERY CIRCULATION:  M1 segment and middle cerebral artery branches demonstrate normal enhancement bilaterally. DISTAL VERTEBRAL ARTERIES:  Normal distal vertebral arteries. Posterior inferior cerebellar artery origins are normal. Normal vertebral basilar junction. BASILAR ARTERY:  Basilar artery is normal in caliber. Normal superior cerebellar arteries. POSTERIOR CEREBRAL ARTERIES: The right posterior cerebral artery arises from the basilar tip. There is fetal origin of the left posterior cerebral artery. Both demonstrate no focal stenosis. Normal posterior communicating arteries. VENOUS STRUCTURES:  Normal. NON VASCULAR ANATOMY BONY STRUCTURES:  No acute osseous abnormality. Mild multilevel degenerative changes cervical spine SOFT TISSUES OF THE NECK:  Unremarkable. THORACIC INLET:  Normal.     Impression: Age-indeterminate lacunar infarct in right heidy - no comparison imaging at time of examination. This may be subacute or chronic in age. Recommend MRI brain without contrast for further evaluation. Severe chronic microangiopathy. Negative CTA head and neck for large vessel occlusion, dissection, aneurysm, or high-grade stenosis. Additional chronic/incidental findings as detailed above. The study was marked in [facility] for immediate notification. Workstation performed: [id number]     Imaging Studies: I have personally reviewed pertinent reports. EKG, Pathology, and Other Studies: I have personally reviewed pertinent reports. Code Status: Level 1 - Full Code    Counseling / Coordination of Care  Reviewed case with neurology attending, history and physical examination, labs and imaging completed, plan of care as per attending physician. Please see attestation for further details.

## 2023-10-06 NOTE — UTILIZATION REVIEW
Initial Clinical Review    Admission: Date/Time/Statement:   Admission Orders (From admission, onward)     Ordered        10/05/23 2216  Place in Observation  Once                      Orders Placed This Encounter   Procedures   • Place in Observation     Standing Status:   Standing     Number of Occurrences:   1     Order Specific Question:   Level of Care     Answer:   Med Surg [16]     ED Arrival Information     Expected   -    Arrival   10/5/2023 17:58    Acuity   Urgent            Means of arrival   Wheelchair    Escorted by   Family Member    Service   Hospitalist    Admission type   Emergency            Arrival complaint   Dizziness, fainted today            Chief Complaint   Patient presents with   • Syncope     Family reports pt had syncopal episode earlier in the day, witnessed, did not injure self. Family states pt stood and "fainted". Pt denies any complaints. Family reports pt seems more fatigued than usual.       Initial Presentation: 58 y.o. male , presented to the ED @ [facility], from home via wheelchair with family member. Admitted as Observation due to Syncope / Collapse. Date: 10/05/2023     Presents with c/o feeling ill with dizziness, nausea and diarrhea. Poor historian. Family brought him in due to syncopal episode at home while in the bathroom. Vomit x 1 in ED, given IV zofran & IV flds. NIH-0. Monitor on telemetry. Check Orthostatic VS.  Neuro checks. Consult Neuro. PT/OT/ST. Day 2: 10/06/2023   He is acutely agitated and combative this morning currently into soft wrist restraints. Spoke with family member his niece who states he is normally alert, usually knows where he is but never knows the month or year, states is not really cooperative at home either. Consult Geriatrics. Seroquel 12.5mg nightly. Continue work-up for infectious etiology with CXR and stool studies. Telemetry unremarkable overnight. Neuro checks.   Hold lisinopril and chlorthalidone to avoid hypotension, hold parameters on Lopressor. He is already on statin with Lipitor 40 mg daily, check lipid panel and A1c. Start aspirin 81 mg daily. Monitor BMP. Monitor Off Abx for now. PT/OT/ST.      10/06/2023  Consult Neuro:    58year old with history of diabetes, hyperlipidemia and hypertension. Presents to [address] [place] [address] on 10/5/2023, with chief complaint of feeling ill with dizziness, nausea and diarrhea. The family brought him in due to a syncopal episode while at home in the bathroom. Per record review, the patient was standing up saying that he did have a bowel movement when became dizzy, fatigued and hot and passed out.    CTA of head and neck was completed - Age-indeterminate lacunar infarct in right heidy - no comparison imaging at time of examination. This may be subacute or chronic in age. Recommend MRI brain without contrast for further evaluation. Severe chronic microangiopathy. Negative CTA head and neck for large vessel occlusion, dissection, aneurysm, or high-grade stenosis.   Syncope/collapse -the patient was reported to be feeling dizzy hot prior, as if he was going to pass out, he was having nausea/vomiting and diarrhea yesterday as well. He was found to have a DIEGO and was given IV fluids. It is suspected he may have a viral gastroenteritis. There was no reported seizure activity or lateralizing features to this event. This does not appear to be epileptic in nature does not appear to be in the reference of acute ischemic event.   CTA with evidence of age-indeterminate lacunar infarct in the right heidy, suspect this is chronic as he did not have any lateralizing features on description or examination.   As well as imaging appears to be chronic .    • No need for MRI at this time as this looks chronic, with out lateralizing feature by description and limited examination  • Echo recommend as the patient also had syncope  • Aspirin 81 mg, would add with evidence of stroke on imaging. • Atorvastatin 40 mg (home medication, continue)  • Frequent neuro checks. Continue to monitor and notify neurology with any changes. • Check orthostatic blood pressures  • Continue to treat underlying metabolic and infectious derangements, per medicine team - suspect viral gastroenteritis, DIEGO, hypomagnesemia,    - Medical management and supportive care per primary team. Correction of any metabolic or infectious disturbances. - Examined alongside attending        ED Triage Vitals   Temperature Pulse Respirations Blood Pressure SpO2   10/05/23 1823 10/05/23 1822 10/05/23 1822 10/05/23 1822 10/05/23 1822   98.3 °F (36.8 °C) 76 16 143/83 99 %      Temp Source Heart Rate Source Patient Position - Orthostatic VS BP Location FiO2 (%)   10/06/23 0009 10/05/23 1822 10/05/23 1822 10/05/23 1822 --   Oral Monitor Lying Right arm       Pain Score       10/05/23 1822       No Pain          Wt Readings from Last 1 Encounters:   10/06/23 68.8 kg (151 lb 10.8 oz)     Additional Vital Signs:   Date/Time Temp Pulse Resp BP MAP (mmHg) SpO2 O2 Device Patient Position - Orthostatic VS   10/06/23 07:28:21 99 °F (37.2 °C) 91 16 123/68 86 95 % None (Room air) Lying   10/06/23 01:38:16 -- 88 -- 149/83 105 98 % -- --   10/06/23 00:09:15 97.7 °F (36.5 °C) 81 16 154/93 113 97 % None (Room air) Lying   10/05/23 2200 -- 80 16 146/82 106 98 % None (Room air) Lying   10/05/23 2130 -- 80 16 146/88 112 100 % None (Room air) Lying     Date and Time Eye Opening Best Verbal Response Best Motor Response Kodak Coma Scale Score   10/06/23 0009 4 5 6 15   10/05/23 1824 4 5 6 15     10/05/2023   EC, NSR    Pertinent Labs/Diagnostic Test Results:   CTA head and neck with and without contrast   Final Result by Deepthi Macias MD (10/05 2117)   Age-indeterminate lacunar infarct in right heidy - no comparison imaging at time of examination. This may be subacute or chronic in age.  Recommend MRI brain without contrast for further evaluation. Severe chronic microangiopathy. Negative CTA head and neck for large vessel occlusion, dissection, aneurysm, or high-grade stenosis. Additional chronic/incidental findings as detailed above.       XR chest portable    (Results Pending)     Results from last 7 days   Lab Units 10/06/23  0432 10/05/23  1901   WBC Thousand/uL 13.37* 15.65*   HEMOGLOBIN g/dL 13.1 14.0   HEMATOCRIT % 40.6 44.1   PLATELETS Thousands/uL 255 286   NEUTROS ABS Thousands/µL 11.22* 13.09*     Results from last 7 days   Lab Units 10/06/23  0432 10/06/23  0425 10/05/23  1901   SODIUM mmol/L  --  137 137   POTASSIUM mmol/L  --  3.7 4.2   CHLORIDE mmol/L  --  107 100   CO2 mmol/L  --  20* 30   ANION GAP mmol/L  --  10 7   BUN mg/dL  --  18 16   CREATININE mg/dL  --  1.17 1.38*   EGFR ml/min/1.73sq m  --  65 53   CALCIUM mg/dL  --  8.6 9.6   MAGNESIUM mg/dL 2.2  --  1.6*     Results from last 7 days   Lab Units 10/06/23  0425 10/05/23  1901   AST U/L 10* 11*   ALT U/L 7 9   ALK PHOS U/L 61 73   TOTAL PROTEIN g/dL 7.0 8.1   ALBUMIN g/dL 3.7 4.2   TOTAL BILIRUBIN mg/dL 0.48 0.58     Results from last 7 days   Lab Units 10/06/23  0730 10/06/23  0122   POC GLUCOSE mg/dl 172* 155*     Results from last 7 days   Lab Units 10/06/23  0425 10/05/23  1901   GLUCOSE RANDOM mg/dL 160* 194*     Results from last 7 days   Lab Units 10/05/23  2315 10/05/23  2101 10/05/23  1901   HS TNI 0HR ng/L  --   --  3   HS TNI 2HR ng/L  --  2  --    HSTNI D2 ng/L  --  -1  --    HS TNI 4HR ng/L 11  --   --    HSTNI D4 ng/L 8  --   --      Results from last 7 days   Lab Units 10/05/23  1901   PROTIME seconds 15.0*   INR  1.18   PTT seconds 28     Results from last 7 days   Lab Units 10/05/23  1901   TSH 3RD GENERATON uIU/mL 2.239     Results from last 7 days   Lab Units 10/06/23  0432   PROCALCITONIN ng/ml 0.21     Results from last 7 days   Lab Units 10/05/23  1901   BNP pg/mL 22     Results from last 7 days   Lab Units 10/06/23  0425   LIPASE u/L 6* Results from last 7 days   Lab Units 10/06/23  0614   CLARITY UA  Clear   COLOR UA  Light Yellow   SPEC GRAV UA  >=1.050*   PH UA  5.5   GLUCOSE UA mg/dl Negative   KETONES UA mg/dl Negative   BLOOD UA  Negative   PROTEIN UA mg/dl Trace*   NITRITE UA  Negative   BILIRUBIN UA  Negative   UROBILINOGEN UA (BE) mg/dl <2.0   LEUKOCYTES UA  Small*   WBC UA /hpf 2-4*   RBC UA /hpf 2-4*   BACTERIA UA /hpf None Seen   EPITHELIAL CELLS WET PREP /hpf Occasional   MUCUS THREADS  Moderate*     ED Treatment:   Medication Administration from 10/05/2023 1758 to 10/06/2023 0004       Date/Time Order Dose Route Action     10/05/2023 1945 EDT sodium chloride 0.9 % bolus 2,000 mL 2,000 mL Intravenous New Bag     10/05/2023 1944 EDT meclizine (ANTIVERT) tablet 25 mg 25 mg Oral Given     10/05/2023 2102 EDT magnesium gluconate (MAGONATE) tablet 500 mg 500 mg Oral Given     10/05/2023 2019 EDT iohexol (OMNIPAQUE) 350 MG/ML injection (MULTI-DOSE) 85 mL 85 mL Intravenous Given     10/05/2023 2041 EDT ondansetron (ZOFRAN) injection 4 mg 4 mg Intravenous Given        Past Medical History:   Diagnosis Date   • Blind    • Diabetes mellitus ([address])    • Disease of thyroid gland    • Hyperlipidemia    • Hypertension      Present on Admission:  • Syncope and collapse  • Other specified hypothyroidism  • Mixed hyperlipidemia  • Essential (primary) hypertension  • Intellectual disability  • Neutrophilic leukocytosis  • Hypomagnesemia  • Acute kidney injury (HCC)  • Type 2 diabetes mellitus with hyperglycemia, without long-term current use of insulin (HCC)  • Vomiting and diarrhea      Admitting Diagnosis: Dehydration [E86.0]  Dizziness [R42]  Syncope [R55]  DIEGO (acute kidney injury) ([address]) [N17.9]  Abnormal computed tomography angiography (CTA) [R93.89]  Age/Sex: 58 y.o. male  Admission Orders:  Diabetic clear liquids  Up & OOB as tolerated / Ambuate TID  Telemetry  I&O q8h  Neuro checks q4h  Obtain ECHO & Chest XR  Strict contact isolation / hand washing   R/O C. Diff  PT/OT/ST    Scheduled Medications:  aspirin, 81 mg, Oral, Daily  atorvastatin, 40 mg, Oral, Daily With Dinner  enoxaparin, 40 mg, Subcutaneous, Daily  insulin lispro, 1-5 Units, Subcutaneous, 4x Daily (AC & HS)  levothyroxine, 75 mcg, Oral, Early Morning  metoprolol tartrate, 50 mg, Oral, Q12H NUHA  QUEtiapine, 12.5 mg, Oral, HS      Continuous IV Infusions:     PRN Meds:  acetaminophen, 650 mg, Oral, Q6H PRN  aluminum-magnesium hydroxide-simethicone, 30 mL, Oral, Q6H PRN  ondansetron, 4 mg, Intravenous, Q6H PRN  simethicone, 80 mg, Oral, 4x Daily PRN        IP CONSULT TO NEUROLOGY  IP CONSULT TO GERONTOLOGY    Network Utilization Review Department  ATTENTION: Please call with any questions or concerns to [phone] and carefully listen to the prompts so that you are directed to the right person. All voicemails are confidential.   For Discharge needs, contact Care Management DC Support Team at [phone] opt. [phone]  Send all requests for admission clinical reviews, approved or denied determinations and any other requests to dedicated fax number below belonging to the campus where the patient is receiving treatment.  List of dedicated fax numbers for the Facilities:  [place] DENIALS (Administrative/Medical Necessity) [phone]   DISCHARGE SUPPORT TEAM (NETWORK) [address] [facility] (Maternity/NICU/Pediatrics) [phone] [address] [facility] [address] [facility] [phone]   [address] [place] [address] [id number] [place] [address] [phone]   [address] [facility] [phone]   [address] [place] [address]  [facility] [phone]

## 2023-10-06 NOTE — ASSESSMENT & PLAN NOTE
· Mg 1.6, likely due to GI losses from emesis  · Will give dose of IV magnesium and repeat a.m. level

## 2023-10-06 NOTE — ASSESSMENT & PLAN NOTE
No results found for: "HGBA1C"    Recent Labs     10/06/23  0122 10/06/23  0730   POCGLU 155* 172*       Blood Sugar Average: Last 72 hrs:  (P) 163.5   Check A1c , none on file   SSI, accuchecks   Hold metformin and [facility] [place]

## 2023-10-06 NOTE — OCCUPATIONAL THERAPY NOTE
Occupational Therapy Evaluation     Patient Name: Lindsey Joseph  QXINM'B Date: 10/6/2023  Problem List  Principal Problem:    Syncope and collapse  Active Problems:    Other specified hypothyroidism    Mixed hyperlipidemia    Essential (primary) hypertension    Intellectual disability    Neutrophilic leukocytosis    Acute kidney injury ([address])    Type 2 diabetes mellitus with hyperglycemia, without long-term current use of insulin ([facility])    Vomiting and diarrhea    Legally blind    Past Medical History  Past Medical History:   Diagnosis Date    Blind     Diabetes mellitus ([address])     Disease of thyroid gland     Hyperlipidemia     Hypertension      Past Surgical History  History reviewed. No pertinent surgical history. 10/06/23 1030   Note Type   Note type Evaluation   Pain Assessment   Pain Assessment Tool FLACC   Pain Rating: FLACC (Rest) - Face 0   Pain Rating: FLACC (Rest) - Legs 0   Pain Rating: FLACC (Rest) - Activity 0   Pain Rating: FLACC (Rest) - Cry 1   Pain Rating: FLACC (Rest) - Consolability 0   Score: FLACC (Rest) 1   Restrictions/Precautions   Other Precautions Contact/isolation;Cognitive; Chair Alarm; Bed Alarm; Fall Risk;Visual impairment; Restraints;Hard of hearing  ([employer]  used; r/o C-diff)   Home Living   Type of [facility]; Able to live on main level with bedroom/bathroom   Prior Function   Lives With Family  (niece)   Falls in the last 6 months 0   Comments PTA family(niece) states pt had assistance with his ADLs, transfers, ambulation--HHA; able to self-feed; HHA-10hrs/day/7 days/wk, neg falls, neg home alone   Lifestyle   Autonomy PTA family(niece) states pt had assistance with his ADLs, transfers, ambulation--HHA; able to self-feed; HHA-10hrs/day/7 days/wk, neg falls, neg home alone   Reciprocal Relationships supportive family   Service to Others unable to assess   Intrinsic Gratification spending time with family   Subjective   Subjective limited verbalization   ADL   Where Assessed Edge of bed   Eating Assistance 5  Supervision/Setup   Grooming Assistance 5  Supervision/Setup   UB Bathing Assistance 4  Minimal Assistance   LB Bathing Assistance 3  Moderate Assistance   UB Dressing Assistance 4  Minimal Assistance   LB Dressing Assistance 2  Maximal [address]  2  Maximal Assistance   Toileting Deficit Use of bedpan/urinal setup   Bed Mobility   Rolling R 4  Minimal assistance   Additional items Assist x 1; Increased time required;Verbal cues;LE management   Rolling L 4  Minimal assistance   Additional items Assist x 1; Increased time required;Verbal cues;LE management   Supine to Sit 4  Minimal assistance   Additional items Assist x 1; Increased time required;Verbal cues;LE management   Transfers   Sit to Stand 4  Minimal assistance   Additional items Assist x 1; Increased time required;Verbal cues   Stand to Sit 4  Minimal assistance   Additional items Assist x 1; Increased time required;Verbal cues   Stand pivot 4  Minimal assistance   Additional items Assist x 1; Increased time required;Verbal cues   Functional Mobility   Functional Mobility 4  Minimal assistance   Additional items Hand hold assistance   Balance   Static Sitting Fair +   Dynamic Sitting Fair   Static Standing Fair   Dynamic Standing Fair -   Activity Tolerance   Activity Tolerance Patient tolerated treatment well   Medical Staff Made Aware nsg, P.T., CM   RUE Assessment   RUE Assessment WFL   RUE Strength   RUE Overall Strength Within Functional Limits - able to perform ADL tasks with strength  (3+/5 throughout)   LUE Assessment   LUE Assessment WFL   LUE Strength   LUE Overall Strength Within Functional Limits - able to perform ADL tasks with strength  (3+/5 throughout)   Hand Function   Gross Motor Coordination Functional   Fine Motor Coordination Functional   Sensation   Light Touch No apparent deficits   Proprioception   Proprioception No apparent deficits Vision-Basic Assessment   Visual History   (blind)   Vision - Complex Assessment   Acuity   (impaired)   Cognition   Overall Cognitive Status Impaired   Arousal/Participation Alert   Attention Attends with cues to redirect   Orientation Level Oriented to person;Disoriented to place; Disoriented to time;Disoriented to situation   Memory   (unable to fully assess)   Following Commands Follows one step commands with increased time or repetition   Comments family states pt currently at his cognitive baseline   Assessment   Limitation Decreased ADL status; Decreased UE strength;Decreased Safe judgement during ADL;Decreased cognition;Decreased endurance;Decreased high-level ADLs   Prognosis Fair   Assessment Pt is a 62y/o male admitted to the hospital 2* symptoms of dizziness, nausea, diarrhea, and unresponsiveness. Pt noted with syncope and collapse. Pt with PMH intelluectual disability, blindness, DM, HTN, hearing loss. PTA family(niece) states pt had assistance with his ADLs, transfers, ambulation--HHA; able to self-feed; HHA-10hrs/day/7 days/wk, neg falls, neg home alone. During initial eval, pt demonstrated deficits with his functional balance, functional mobility, ADL status, transfer safety, activity tolerance, and cognition(i.e.orientation, memory, problem-solving, judgement/safety). Pt appears close to his functional baseline(family aggrees) and family states no concerns about pt going directly home. Pt would benefit from a restorative program on the unit to improve his overall endurance, balance, and mobility. Acute OT tx not indicated at this time 2* pt's limited deviation from his functional baseline.    Goals   Patient Goals unable to assess   Plan   OT Frequency Eval only   Recommendation   OT Discharge Recommendation No rehabilitation needs   AM-PAC Daily Activity Inpatient   Lower Body Dressing 2   Bathing 2   Toileting 2   Upper Body Dressing 2   Grooming 2   Eating 3   Daily Activity Raw Score 13   Daily Activity Standardized Score (Calc for Raw Score >=11) 32.03   AM-PAC Applied Cognition Inpatient   Following a Speech/Presentation 2   Understanding Ordinary Conversation 2   Taking Medications 1   Remembering Where Things Are Placed or Put Away 1   Remembering List of 4-5 Errands 1   Taking Care of Complicated Tasks 1   Applied Cognition Raw Score 8   Applied Cognition Standardized Score 19.32   [facility]

## 2023-10-06 NOTE — ASSESSMENT & PLAN NOTE
Family reports patient was walking to the bathroom and complained of feeling dizzy and hot then had a syncopal event. assisted him to the ground. While laying on the ground he felt like he was going to pass out again. Denied seizure-like activity.  reported diarrhea and emesis   · CTA head/neck on arrival: Age-indeterminate lacunar infarct in the right heidy, may be subacute or chronic in age, severe chronic microangiopathic, negative CTA head and neck for large vessel occlusion dissection or aneurysm or high-grade stenosis  · Neurology consulted, appreciate recs   · Started on aspirin 81 mg daily to continue at discharge, continue home regimen Lipitor 40 mg daily, no indication for inpatient MRI brain as the stroke is likely old  · Outpatient follow-up neurology    · Echocardiogram was ordered but family wants to take him home today, I educated she should get an echocardiogram outpatient with PCP within 1 to 2 weeks and family demonstrated understanding and states they will arrange for this to happen  · He is now back to baseline per family at bedside  · Discussed staying overnight 1 more night but family does not wish for this to happen and wants to take him home as he gets agitated in the hospital and confused

## 2023-10-06 NOTE — DISCHARGE SUMMARY
[facility]  Discharge- [address] 1960, 58 y.o. male MRN: [id number]  Unit/Bed#: E5 -01 Encounter: [id number]  Primary Care Provider: Antoinette Paulino MD   Date and time admitted to hospital: 10/5/2023  6:12 PM    * Syncope and collapse  Assessment & Plan  Family reports patient was walking to the bathroom and complained of feeling dizzy and hot then had a syncopal event. assisted him to the ground. While laying on the ground he felt like he was going to pass out again. Denied seizure-like activity.  reported diarrhea and emesis   · CTA head/neck on arrival: Age-indeterminate lacunar infarct in the right heidy, may be subacute or chronic in age, severe chronic microangiopathic, negative CTA head and neck for large vessel occlusion dissection or aneurysm or high-grade stenosis  · Neurology consulted, appreciate recs   · Started on aspirin 81 mg daily to continue at discharge, continue home regimen Lipitor 40 mg daily, no indication for inpatient MRI brain as the stroke is likely old  · Outpatient follow-up neurology    · Echocardiogram was ordered but family wants to take him home today, I educated she should get an echocardiogram outpatient with PCP within 1 to 2 weeks and family demonstrated understanding and states they will arrange for this to happen  · He is now back to baseline per family at bedside  · Discussed staying overnight 1 more night but family does not wish for this to happen and wants to take him home as he gets agitated in the hospital and confused    Legally blind  Assessment & Plan  · Per outpt records legally blind     Vomiting and diarrhea  Assessment & Plan  · Patient having vomiting and diarrhea  · Lipase 6, LFTS WNL  · procal negative   · No further nausea and vomiting now tolerating diet  · Likely gastroenteritis, return for any ongoing vomiting or diarrhea or inability tolerate oral intake or fevers    Type 2 diabetes mellitus with hyperglycemia, without long-term current use of insulin ([address])  Assessment & Plan  No results found for: "HGBA1C"    Recent Labs     10/06/23  0122 10/06/23  0730 10/06/23  1135   POCGLU 155* 172* 172*       Blood Sugar Average: Last 72 hrs:  (P) [id number]   Follow-up A1c with PCP outpatient, continue metformin and januvia     Acute kidney injury ([address])  Assessment & Plan  · Creatinine 1.3, baseline 0.8-0.9, likely prerenal due to GI losses from reported diarrhea on admission   · No diarrhea at all today   · Stool study collection pending - has not been completed due to no ongoing diarrhea   · Received IVF overnight and renal function improved today Cr 1.17   · Hold chlorthalidone and lisinopril today   · Can resume home BP meds tomorrow with follow up BMP and BP monitoring in 5-7 days with PCP   · Avoid NSAIDs, nephrotoxins and hypotension      Neutrophilic leukocytosis  Assessment & Plan  · WBC 15.6, ANC 13 --> 11  · Leukocytosis is trending down  · UA without acute infection  · No further diarrhea, no abdominal pain   · Now tolerating diet   · Pro-Enrique negative x1  · No indication for antibiotics at this time   · recommend CBC in 1 week   · Return to ER with any fevers       Intellectual disability  Assessment & Plan  · Documented history of learning/Intellectual disability since youth    Essential (primary) hypertension  Assessment & Plan  · Home regimen: Chlorthalidone, lisinopril and metoprolol      Mixed hyperlipidemia  Assessment & Plan  · Continue statin with Lipitor 40 mg daily per home regimen      Other specified hypothyroidism  Assessment & Plan  · Levothyroxine 75 mcg daily     Hypomagnesemia-resolved as of 10/6/2023  Assessment & Plan  · Mg 1.6, on arrival repleted      Medical Problems     Resolved Problems  Date Reviewed: 10/6/2023          Resolved    Hypomagnesemia 10/6/2023     Resolved by  Colt Tay PA-C        Discharging Physician / Practitioner: Colt Tay PA-C  PCP: Socorro Perez MD  Admission Date:   Admission Orders (From admission, onward)     Ordered        10/05/23 2216  Place in Observation  Once                      Discharge Date: 10/06/23    Consultations During Hospital Stay:  · Neurology   · Geriatrics     Procedures Performed:   · none    Significant Findings / Test Results:   · CTA head/neck   FINDINGS:  NONCONTRAST BRAIN  PARENCHYMA:     Age-indeterminate lacunar infarct in right heidy.     Decreased attenuation is noted in periventricular and subcortical white matter demonstrating an appearance that is statistically most likely to represent advanced microangiopathic change. No intracranial mass, mass effect or midline shift. No acute   parenchymal hemorrhage.     VENTRICLES AND EXTRA-AXIAL SPACES:  Normal for the patient's age.     VISUALIZED ORBITS: Normal visualized orbits.     PARANASAL SINUSES: Mild mucosal thickening of the visualized paranasal sinuses.     CERVICAL VASCULATURE  AORTIC ARCH AND GREAT VESSELS:  Normal aortic arch and great vessel origins. Normal visualized subclavian vessels.     RIGHT VERTEBRAL ARTERY CERVICAL SEGMENT:  Normal origin. The vessel is normal in caliber throughout the neck.     LEFT VERTEBRAL ARTERY CERVICAL SEGMENT:  Normal origin. The vessel is normal in caliber throughout the neck.     RIGHT EXTRACRANIAL CAROTID SEGMENT:  Normal caliber common carotid artery. Normal bifurcation and cervical internal carotid artery. No stenosis or dissection. Retropharyngeal course of right ICA, normal variant.     LEFT EXTRACRANIAL CAROTID SEGMENT:  Normal caliber common carotid artery. Normal bifurcation and cervical internal carotid artery. No stenosis or dissection. Retropharyngeal course of left ICA, normal variant.     NASCET criteria was used to determine the degree of internal carotid artery diameter stenosis.         INTRACRANIAL VASCULATURE  INTERNAL CAROTID ARTERIES:  Normal enhancement of the intracranial portions of the internal carotid arteries with mild calcified atherosclerotic disease in right ICA supraclinoid segment. Normal ophthalmic artery origins. Normal ICA terminus.     ANTERIOR CIRCULATION:  Symmetric A1 segments and anterior cerebral arteries with normal enhancement. Normal anterior communicating artery.     MIDDLE CEREBRAL ARTERY CIRCULATION:  M1 segment and middle cerebral artery branches demonstrate normal enhancement bilaterally.     DISTAL VERTEBRAL ARTERIES:  Normal distal vertebral arteries. Posterior inferior cerebellar artery origins are normal. Normal vertebral basilar junction.     BASILAR ARTERY:  Basilar artery is normal in caliber. Normal superior cerebellar arteries.     POSTERIOR CEREBRAL ARTERIES: The right posterior cerebral artery arises from the basilar tip. There is fetal origin of the left posterior cerebral artery. Both demonstrate no focal stenosis. Normal posterior communicating arteries.     VENOUS STRUCTURES:  Normal.        NON VASCULAR ANATOMY  BONY STRUCTURES:  No acute osseous abnormality. Mild multilevel degenerative changes cervical spine     SOFT TISSUES OF THE NECK:  Unremarkable.     THORACIC INLET:  Normal.        IMPRESSION:     Age-indeterminate lacunar infarct in right heidy - no comparison imaging at time of examination. This may be subacute or chronic in age. Recommend MRI brain without contrast for further evaluation.     Severe chronic microangiopathy.     Negative CTA head and neck for large vessel occlusion, dissection, aneurysm, or high-grade stenosis.     Additional chronic/incidental findings as detailed above.     ·   CXR   FINDINGS:     Lordotic positioning.     Lungs are clear.     No effusion or pneumothorax.     Heart, mediastinal and hilar structures are within normal limits.     No acute osseous or soft tissue pathology.     IMPRESSION:  No acute cardiopulmonary findings.     · Lipid panel : LDL 20, HDL 30, triglyceride 36, total cholesterol 57  · Procalcitonin 0.21  · BNP 22  · Serial troponin 3, 2, 11  · Urinalysis: Presence of small leukocytes, trace protein, 2-4 RBC and WBC    Incidental Findings:   None    Test Results Pending at Discharge (will require follow up): · BMP and CBC in 1 week   · Follow-up A1c     Outpatient Tests Requested:  · Primary care provider within 5 to 7 days  · Neurology    Complications:      Reason for Admission: Syncope and collapse    Hospital Course:   Maryann Moeller is a 58 y.o. male patient who originally presented to the hospital on 10/5/2023 due to episode of feeling dizzy and hot and then had a syncopal episode where he was assisted to the ground when walking to the bathroom. He does note some vomiting and diarrhea prior to admission. He has had no further diarrhea since arrival therefore stool enteric panel and C. difficile were not sent. He is tolerating diet. He did have mild acute kidney injury that resolved overnight with IV fluids. He had a CTA head/neck which revealed age-indeterminate lacunar infarct in the right heidy. He was seen in consultation with neurology and suspect this is chronic stroke without recommendation for any inpatient MRI brain. He was started on aspirin 81 mg daily. He should continue on Lipitor 40 mg daily per home regimen. He needs outpatient follow-up with neurology in the stroke clinic. Echocardiogram was ordered but patient wanted to take him home and did not want him to stay overnight again. I did educate family at bedside that he needs echocardiogram completed within 1 to 2 weeks with PCP outpatient and he demonstrated understanding and stated will follow-up with PCP within 1 week. He should also have a BMP and CBC in 1 week to monitor renal function electrolytes and resolution of leukocytosis. I suspect he had episode of gastroenteritis causing diarrhea.   He should return to the emergency department any fevers, worsening or ongoing diarrhea, vomiting or inability to tolerate oral intake. Please see above list of diagnoses and related plan for additional information. Condition at Discharge: stable    Discharge Day Visit / Exam:   * Please refer to separate progress note for these details *    Discussion with Family: Updated  (niece) at bedside. Discharge instructions/Information to patient and family:   See after visit summary for information provided to patient and family. Provisions for Follow-Up Care:  See after visit summary for information related to follow-up care and any pertinent home health orders. Disposition:   Home    Planned Readmission: none     Discharge Statement:  I spent 45 minutes discharging the patient. This time was spent on the day of discharge. I had direct contact with the patient on the day of discharge. Greater than 50% of the total time was spent examining patient, answering all patient questions, arranging and discussing plan of care with patient as well as directly providing post-discharge instructions. Additional time then spent on discharge activities. Discharge Medications:  See after visit summary for reconciled discharge medications provided to patient and/or family.       **Please Note: This note may have been constructed using a voice recognition system**

## 2023-10-06 NOTE — ASSESSMENT & PLAN NOTE
Family reports patient was walking to the bathroom and complained of feeling dizzy and hot then had a syncopal event. Wife assisted him to the ground. While laying on the ground he felt like he was going to pass out again. Denied seizure-like activity. · Patient reports dizziness and nausea resolved; reports diarrhea yesterday. He had emesis in ED and was given Zofran and IVF. · Symptoms may be 2/2 viral gastroenteritis    · CTA H/N:   · Age-indeterminate lacunar infarct in right heidy - no comparison imaging at time of examination. This may be subacute or chronic in age. Recommend MRI brain without contrast for further evaluation.    · Severe chronic microangiopathy.   · No large vessel occlusion or hi grade stenosis    · [facility] 8/2022 showing cerebral atrophy with chronic ischemic changes  · Monitor on telemetry  · Check orthostatic VS  · Neurochecks  · MRI ordered  · Neurology consult

## 2023-10-06 NOTE — ASSESSMENT & PLAN NOTE
58year old with history of diabetes, hyperlipidemia and hypertension. Presents to [address] [place] [address] on 10/5/2023, with chief complaint of feeling ill with dizziness, nausea and diarrhea. The family brought him in due to a syncopal episode while at home in the bathroom. Per record review, the patient was standing up saying that he did have a bowel movement when became dizzy, fatigued and hot and passed out. CTA of head and neck was completed - Age-indeterminate lacunar infarct in right heidy - no comparison imaging at time of examination. This may be subacute or chronic in age. Recommend MRI brain without contrast for further evaluation. Severe chronic microangiopathy. Negative CTA head and neck for large vessel occlusion, dissection, aneurysm, or high-grade stenosis. Syncope/collapse -the patient was reported to be feeling dizzy hot prior, as if he was going to pass out, he was having nausea/vomiting and diarrhea yesterday as well. He was found to have a DIEGO and was given IV fluids. It is suspected he may have a viral gastroenteritis. There was no reported seizure activity or lateralizing features to this event. This does not appear to be epileptic in nature does not appear to be in the reference of acute ischemic event. CTA with evidence of age-indeterminate lacunar infarct in the right heidy, suspect this is chronic as he did not have any lateralizing features on description or examination. As well as imaging appears to be chronic . • No need for MRI at this time as this looks chronic, with out lateralizing feature by description and limited examination  • Echo recommend as the patient also had syncope  • Aspirin 81 mg, would add with evidence of stroke on imaging. • Atorvastatin 40 mg (home medication, continue)  • Frequent neuro checks. Continue to monitor and notify neurology with any changes.    • Check orthostatic blood pressures  • Continue to treat underlying metabolic and infectious derangements, per medicine team - suspect viral gastroenteritis, DIEGO, hypomagnesemia,    - Medical management and supportive care per primary team. Correction of any metabolic or infectious disturbances.    - Examined alongside attending

## 2023-10-06 NOTE — ASSESSMENT & PLAN NOTE
· Home regimen: Chlorthalidone, lisinopril and metoprolol  · Continue Lopressor 50 mg every 12 hours with hold parameters  · Hold lisinopril and chlorthalidone

## 2023-10-06 NOTE — ASSESSMENT & PLAN NOTE
· Creatinine 1.3, baseline 0.8-0.9, likely prerenal due to GI losses  · Received 2L IVF in ED.  Will give 1 additional L 0.9NS    · Hold chlorthalidone and lisinopril  · Monitor intake and output/urinary retention protocol  · Avoid NSAIDs, nephrotoxins and hypotension  · Monitor serum creatinine

## 2023-10-06 NOTE — PLAN OF CARE
Problem: Potential for Falls  Goal: Patient will remain free of falls  Description: INTERVENTIONS:  - Educate patient/family on patient safety including physical limitations  - Instruct patient to call for assistance with activity   - Consult OT/PT to assist with strengthening/mobility   - Keep Call bell within reach  - Keep bed low and locked with side rails adjusted as appropriate  - Keep care items and personal belongings within reach  - Initiate and maintain comfort rounds  - Make Fall Risk Sign visible to staff  - Apply yellow socks and bracelet for high fall risk patients  - Consider moving patient to room near nurses station  Outcome: Progressing     Problem: PAIN - ADULT  Goal: Verbalizes/displays adequate comfort level or baseline comfort level  Description: Interventions:  - Encourage patient to monitor pain and request assistance  - Assess pain using appropriate pain scale  - Administer analgesics based on type and severity of pain and evaluate response  - Implement non-pharmacological measures as appropriate and evaluate response  - Consider cultural and social influences on pain and pain management  - Notify physician/advanced practitioner if interventions unsuccessful or patient reports new pain  Outcome: Progressing     Problem: INFECTION - ADULT  Goal: Absence or prevention of progression during hospitalization  Description: INTERVENTIONS:  - Assess and monitor for signs and symptoms of infection  - Monitor lab/diagnostic results  - Monitor all insertion sites, i.e. indwelling lines, tubes, and drains  - Monitor endotracheal if appropriate and nasal secretions for changes in amount and color  - [place] appropriate cooling/warming therapies per order  - Administer medications as ordered  - Instruct and encourage patient and family to use good hand hygiene technique  - Identify and instruct in appropriate isolation precautions for identified infection/condition  Outcome: Progressing     Problem: SAFETY ADULT  Goal: Patient will remain free of falls  Description: INTERVENTIONS:  - Educate patient/family on patient safety including physical limitations  - Instruct patient to call for assistance with activity   - Consult OT/PT to assist with strengthening/mobility   - Keep Call bell within reach  - Keep bed low and locked with side rails adjusted as appropriate  - Keep care items and personal belongings within reach  - Initiate and maintain comfort rounds  - Make Fall Risk Sign visible to staff  - Apply yellow socks and bracelet for high fall risk patients  - Consider moving patient to room near nurses station  Outcome: Progressing  Goal: Maintain or return to baseline ADL function  Description: INTERVENTIONS:  -  Assess patient's ability to carry out ADLs; assess patient's baseline for ADL function and identify physical deficits which impact ability to perform ADLs (bathing, care of mouth/teeth, toileting, grooming, dressing, etc.)  - Assess/evaluate cause of self-care deficits   - Assess range of motion  - Assess patient's mobility; develop plan if impaired  - Assess patient's need for assistive devices and provide as appropriate  - Encourage maximum independence but intervene and supervise when necessary  - Involve family in performance of ADLs  - Assess for home care needs following discharge   - Consider OT consult to assist with ADL evaluation and planning for discharge  - Provide patient education as appropriate  Outcome: Progressing  Goal: Maintains/Returns to pre admission functional level  Description: INTERVENTIONS:  - Perform BMAT or MOVE assessment daily.   - Set and communicate daily mobility goal to care team and patient/family/caregiver.    - Collaborate with rehabilitation services on mobility goals if consulted  - Out of bed for toileting  - Record patient progress and toleration of activity level   Outcome: Progressing     Problem: DISCHARGE PLANNING  Goal: Discharge to home or other facility with appropriate resources  Description: INTERVENTIONS:  - Identify barriers to discharge w/patient and caregiver  - Arrange for needed discharge resources and transportation as appropriate  - Identify discharge learning needs (meds, wound care, etc.)  - Arrange for interpretive services to assist at discharge as needed  - Refer to Case Management Department for coordinating discharge planning if the patient needs post-hospital services based on physician/advanced practitioner order or complex needs related to functional status, cognitive ability, or social support system  Outcome: Progressing     Problem: Knowledge Deficit  Goal: Patient/family/caregiver demonstrates understanding of disease process, treatment plan, medications, and discharge instructions  Description: Complete learning assessment and assess knowledge base.   Interventions:  - Provide teaching at level of understanding  - Provide teaching via preferred learning methods  Outcome: Progressing     Problem: METABOLIC, FLUID AND ELECTROLYTES - ADULT  Goal: Electrolytes maintained within normal limits  Description: INTERVENTIONS:  - Monitor labs and assess patient for signs and symptoms of electrolyte imbalances  - Administer electrolyte replacement as ordered  - Monitor response to electrolyte replacements, including repeat lab results as appropriate  - Instruct patient on fluid and nutrition as appropriate  Outcome: Progressing  Goal: Fluid balance maintained  Description: INTERVENTIONS:  - Monitor labs   - Monitor I/O and WT  - Instruct patient on fluid and nutrition as appropriate  - Assess for signs & symptoms of volume excess or deficit  Outcome: Progressing     Problem: MUSCULOSKELETAL - ADULT  Goal: Maintain or return mobility to safest level of function  Description: INTERVENTIONS:  - Assess patient's ability to carry out ADLs; assess patient's baseline for ADL function and identify physical deficits which impact ability to perform ADLs (bathing, care of mouth/teeth, toileting, grooming, dressing, etc.)  - Assess/evaluate cause of self-care deficits   - Assess range of motion  - Assess patient's mobility  - Assess patient's need for assistive devices and provide as appropriate  - Encourage maximum independence but intervene and supervise when necessary  - Involve family in performance of ADLs  - Assess for home care needs following discharge   - Consider OT consult to assist with ADL evaluation and planning for discharge  - Provide patient education as appropriate  Outcome: Progressing     Problem: MOBILITY - ADULT  Goal: Maintain or return to baseline ADL function  Description: INTERVENTIONS:  -  Assess patient's ability to carry out ADLs; assess patient's baseline for ADL function and identify physical deficits which impact ability to perform ADLs (bathing, care of mouth/teeth, toileting, grooming, dressing, etc.)  - Assess/evaluate cause of self-care deficits   - Assess range of motion  - Assess patient's mobility; develop plan if impaired  - Assess patient's need for assistive devices and provide as appropriate  - Encourage maximum independence but intervene and supervise when necessary  - Involve family in performance of ADLs  - Assess for home care needs following discharge   - Consider OT consult to assist with ADL evaluation and planning for discharge  - Provide patient education as appropriate  Outcome: Progressing  Goal: Maintains/Returns to pre admission functional level  Description: INTERVENTIONS:  - Perform BMAT or MOVE assessment daily.   - Set and communicate daily mobility goal to care team and patient/family/caregiver.    - Collaborate with rehabilitation services on mobility goals if consulted  - Out of bed for toileting  - Record patient progress and toleration of activity level   Outcome: Progressing     Problem: Prexisting or High Potential for Compromised Skin Integrity  Goal: Skin integrity is maintained or improved  Description: INTERVENTIONS:  - Identify patients at risk for skin breakdown  - Assess and monitor skin integrity  - Assess and monitor nutrition and hydration status  - Monitor labs   - Assess for incontinence   - Turn and reposition patient  - Assist with mobility/ambulation  - Relieve pressure over bony prominences  - Avoid friction and shearing  - Provide appropriate hygiene as needed including keeping skin clean and dry  - Evaluate need for skin moisturizer/barrier cream  - Collaborate with interdisciplinary team   - Patient/family teaching  - Consider wound care consult   Outcome: Progressing

## 2023-10-06 NOTE — ASSESSMENT & PLAN NOTE
· WBC 15.6, ANC 13 --> 11  · Leukocytosis is trending down  · UA without acute infection  · Stool studies are pending  · Patient did vomit again this morning  · Due to significant agitation we will hold off on CT abdomen pelvis at this time  · Continue serial abdominal exams  · Monitor stool output, no diarrhea this morning  · Monitor for any episodes of vomiting  · Check CXR this morning   · Speech consulted  · Pro-Enrique negative x1

## 2023-10-06 NOTE — ASSESSMENT & PLAN NOTE
· Creatinine 1.3, baseline 0.8-0.9, likely prerenal due to GI losses from reported diarrhea on admission   · No diarrhea this AM   · Stool study collection pending   · Received IVF overnight and renal function improved today Cr 1.17   · Hold further IVF and encourage po intake   · Hold chlorthalidone and lisinopril  · Monitor intake and output/urinary retention protocol  · Avoid NSAIDs, nephrotoxins and hypotension  · Monitor BMP

## 2023-10-06 NOTE — ASSESSMENT & PLAN NOTE
· Patient having vomiting and diarrhea  · Lipase 6, LFTS WNL  · procal negative   · No further nausea and vomiting now tolerating diet  · Likely gastroenteritis, return for any ongoing vomiting or diarrhea or inability tolerate oral intake or fevers

## 2023-10-06 NOTE — CONSULTS
Consultation - Geriatrics   Luisana Parker 58 y.o. male MRN: [id number]  Unit/Bed#: E5 -01 Encounter: [id number]      Assessment/Plan    Cognitive Screening   · Patient has a documented history of cognitive disability at baseline   · Niece at the bedside states patient is alert and oriented to person and place at baseline   · She notes no agitation or behaviors at home   · Patient was noted to be agitated this morning and overnight requiring IM Zyprexa and restraints   · Niece notes he was agitated because he has never been to the hospital and was anxious and nervous  · Suspect this could also be related to sleep disturbance and legal blindness in a new unfamiliar environment   · Nursing notes the patient is more calm and cooperative after Zyprexa   · He is noted to be calm and cooperative with family at the bedside for me this morning   · He is working with therapy, is out of restraints, and is cooperating with ambulation and with therapy   · Niece notes that he appears to be at his baseline today and is inquiring about taking the patient home today   · Patient will likely not do well here in the hospital secondary to the reasons listed above   · Would recommend discharge once medically stable to home with family   · Attempt to minimize restraint use if possible to prevent deconditioning   · Primary team ordered Seroquel for bedtime tonight   · Can continue with prn Zyprexa for acute agitation and behaviors as discussed below   · Most recent TSH on 10/5/2023 noted to be 2.2239  · No vitamin B 12 level noted in epic  · Can consider checking on routine labs  · CTA of the head and neck on 10/5/2023 revealed age indeterminate lacunar infarct in the right heidy and severe chronic microangiopathy   · Neurology consulted and following   · No MoCA noted in epic  · Patient is alert and oriented to person and place on exam today   · Maintain delirium precautions as discussed below  · Redirect and reorient as needed  · Keep physically, mentally, and socially active     Delirium  • Baseline mentation: alert and oriented to person and place  • Current mentation: alert and oriented to person and place   • Patient is at high risk secondary to age, intellectual disability, DIEGO, electrolyte imbalance, vision impairment (legally blind), hearing impairment, and hospitalization   • Maintain delirium precautions   · Provide redirection, reorientation, and distraction techniques  · Maintain fall and safety precautions   · Assist with ADLs/IADLs  · Avoid deliriogenic medications such as tramadol, benzodiazepines, anticholinergics, benadryl  · Treat pain using geriatric pain protocol   · Encourage oral hydration and nutrition   · Monitor for constipation and urinary retention   · Implement sleep hygiene and limit night time interuptions   · Maintain sleep-wake cycle   · Encourage early and frequent mobilization   · Encourage participation in group activities  • Most recent EKG on 10/5/2023 revealed a QTc interval of 435  · If all other interventions are unsuccessful for acute agitation and behaviors, can consider Zyprexa 2.5 mg IM or oral Q 8 hours prn   • Would avoid benzodiazepines such as Ativan as these can worsen delirium     Deconditioning   • Baseline function: needs assistance with ADLs and is dependent IADLs  • Patient is at increased risk for deconditioning secondary to intellectual disability, weakness, gait dysfunction, and hospitalization   • Continue to optimize diet, hydration, and mobility for healing   · GFR 65 on labs today   · Patient with DIEGO on admission   · Creatinine improved on labs today   · Avoid nephrotoxic medication and renal dose medication   · Keep hydrated   · Diabetes   · Blood sugar log reviewed and blood sugars have been acceptable   · Continue insulin and diet control   · Avoid hypoglycemia   • Monitor for signs and symptoms of infection, dehydration, DVT, and skin breakdown    Frailty   Clinical Frail Scale: 6- Moderately Frail  · Need help with all outside activities  · Need help with stairs and bathing  · May need assistance with dressing  • Most recent albumin on labs today noted to be 3.7  • Consider nutrition consult  • Encourage protein supplementation     Ambulatory Dysfunction/Falls  • Niece at the bedside notes no recent falls at home   · Clarified the events of yesterday and she notes that he did not fall he just got dizzy   · Patient does not ambulate with any assistive devices at baseline but he is a hand-hold assist   · PT/OT consulted to assist with strengthening/mobility and assist with discharge planning to appropriate level of care  • Assess patient frequently for physical needs, encourage use of assistant devices as needed and directed by PT/OT  • Identify cognitive and physical deficits and behaviors that affect risk of falls  • Consider moving patient closer to nursing station to monitor more closely for impulsive behavior which may increase risk of falls  • [place] fall and safety precautions   • Educate patient/family on patient safety including physical limitations and importance of using call bell for assistance   • Modify environment to reduce risk of injury including disconnecting from pole when not in use, ensuring adequate lighting in room and restroom, ensuring that path to restroom is clear and free of trip hazards  • Out of bed as tolerated    Impaired Vision   • Patient is legally blind at baseline   • Encourage adequate lighting and encourage use of assistance with ambulation  • Keep personal belongings close to avoid reaching  • Encourage appropriate footwear at all times  • Recommend large font for printed materials provided to patient    Impaired Hearing   • Patient does have some hearing impairment   • He does not wear hearing aids   • Hearing impairment strongly correlated with depression, cognitive impairment, delirium and falls in the older adult  • Use hearing aids or sound amplifier  • Speak face to face  • Use clear dictation and enunciation of words    Dentition/Appetite   • Patient does wear dentures at baseline   • Niece notes he has a very good appetite at baseline   • Encourage use of dentures at all appropriate times  • Ensure meal consistency is appropriate for all abilities   • Consider nutrition consult   • Continue aspiration precautions     Elimination   • Patient is incontinent of bowel and bladder at times at baseline  • She notes no voiding difficulties but states he does have constipation and diarrhea at times   • No bowel movement documented since admission   • He had been having diarrhea prior to admission   · Stool studies and C diff ordered   · He is not current on a bowel regimen   • Monitor for constipation and urinary retention     Insomnia   • Niece notes no difficulties with sleep at baseline   • He does not appear to take any medication for sleep   • Patient did not sleep well last night   • Seroquel ordered tonight for bedtime   • First line is behavioral therapy   • Avoid sedative hypnotics including benzodiazepines and benadryl  • Encourage staying awake during the day   • Encourage daytime activities and morning exercise   • Decrease or eliminate daytime naps   • Avoid caffeine especially during late afternoon and evening hours  • Establish a nighttime routine  • Implement sleep hygiene and limit nighttime interruptions  • Can consider melatonin 3 mg daily at bedtime for sleep if needed     Anxiety/Depression  • Patient has no documented history of anxiety/depression   • He is not currently on any medication for this at baseline   • Mood appears stable on exam today   • Continue supportive care     Syncope and Collapse   · Family had initially reported the patient was walking to the bathroom and complained of feeling dizzy and hot  · He then had a syncopal event and was assisted to the ground  · While laying on the floor he felt like he was going to pass out again  · Family denied seizure-like activity but reported diarrhea and emesis  · CTA of the head and neck revealed age-indeterminate lacunar infarct in the right heidy that may be subacute or chronic in age and severe chronic microangiopathic pathic disease  · It was negative for large vessel occlusion dissection or aneurysm or high-grade stenosis  · Neurology consulted and consult is currently pending  · Blood pressure medication is currently on hold to avoid hypotension  · He is already on Lipitor and aspirin was started on admission  · Telemetry unremarkable overnight  · Orthostatic vital signs ordered  · Patient remains on neurochecks  · Management per neurology and primary team    Acute Kidney Injury  · Baseline creatinine appears to be 0.8-0.9  · Creatinine on admission noted to be 1.3  · Stool study collection pending as DIEGO suspected to be due to to losses from reported diarrhea  · He did receive IV fluids overnight and renal function improved today with a creatinine of 1.17  · Chlorthalidone and lisinopril are currently on hold  · Encourage oral hydration  · Avoid nephrotoxic medication and renal dose medication  · Management per primary team    Home Safety  · Patient resides at home with his niece  · He also has a caregiver for 10 hours/day 7 days/week  · He needs assistance with ADLs and is dependent for IADLs    Advanced Care Planning  · Level 1 Full Code    Home Medication [facility] ([phone])  · Metformin 1000 mg BID   · Metoprolol tartrate 50 mg BID  · Chlorthalidone 25 mg daily   · Atorvastatin 40 mg daily   · Lisinopril 30 mg daily   · Levothyroxine 75 mcg daily   · Januvia 100 mg daily     I have personally reviewed this medication list with the patients pharmacy listed above. History of Present Illness   Physician Requesting Consult: Oumou Locke MD  Reason for Consult / Principal Problem: Agitation   Hx and PE limited by:  Intellectual disability HPI: Anum Montalvo is a 58y.o. year old male who has pretension, hyperlipidemia, hypothyroidism, diabetes, intellectual disability, legal blindness, and hearing loss and presented to the hospital with complaints of feeling ill and dizzy. He also admitted to nausea and diarrhea. His family brought him in due to a syncopal episode at home while in the bathroom. His wife noted that he was standing up saying that he had to have a bowel movement when he became dizzy, fatigue, hot, and then passed out. His wife lowered him to the ground and he did not hit his head and is unclear if he lost consciousness. He did have emesis in the ER and was giving Zofran. Lab work revealed an DIEGO for which she received IV fluids. This is since resolved. A CT of the head and neck revealed an age-indeterminate lacunar infarct in the right heidy that may be subacute or chronic in age with severe chronic microangiopathy. There were no large vessel occlusions or high-grade stenosis noted. Neurology was consulted and this consult is still pending. He is being monitored on telemetry and no acute events were noted overnight. Orthostatic vital signs have been ordered. He remains on neurochecks. He was noted to be agitated overnight requiring restraints. He he escalated again this morning requiring a dose of IM Zyprexa. Geriatrics has been consulted for agitation. Care was coordinated with the patient's niece at the bedside.  services were utilized. She states that the patient lives at home with her and he gets caregivers for 10 hours/day 7 days/week. He does require assistance with all ADLs. She notes that he does have an intellectual disability at baseline. She states that he is oriented to person and place but not time. She notes no issues with agitation at home.   She states he was likely agitated overnight and this morning as there was no family present and he has never been in the hospital.  She notes that he also has significant vision impairment and is legally blind. She notes that he was feeling nervous and anxious. When asked about any recent falls at home, she denies that he fell prior to admission. She notes no falls at home. She states that he does not use any assistive devices to ambulate at baseline. She states that she does hold onto him when he does ambulate. She notes that he does have some hearing impairment and sometimes will need individuals to speak louder. She notes that he does have dentures. She states that he has a very good appetite at baseline. She notes at times he does get constipated. She notes no issues with urinary retention. She states that he sleeps well at home. The patient was seen and evaluated today at the bedside for geriatric consult. He is noted to be sitting at the edge of the bed in no acute distress. Family is currently at the bedside and he appears to be doing well with them here. He is alert and oriented to person and place on exam today. He is currently denying pain. He offers no other acute complaints. Care was coordinated with the patient's nurse Wagner Johnson. She states that the patient did not sleep well overnight and was agitated. He was put in restraints overnight. She states that this morning he was trying to hit and kick and required a dose of IM Zyprexa. She notes his behaviors and mood improved after that. Care was also coordinated with Colt Tay PA-C over the phone. Discussed medication for agitation while inpatient including linked order for Zyprexa as needed and can continue Seroquel. Would not discharge patient home on these medications as he will likely do well at home. She will inquire about neurology plan. She is aware that once medically stable, the sooner he is discharged the better to prevent agitation, deconditioning from restraints, and medication use.        Inpatient consult to Gerontology  Consult performed by: DINO Ramírez  Consult ordered by: Graham Byrd PA-C        Review of Systems   Unable to perform ROS: Other (patient with intellectual disability)   HENT: Positive for hearing loss. Eyes: Positive for visual disturbance (legally blind). Psychiatric/Behavioral: Positive for sleep disturbance. Negative for agitation and behavioral problems. The patient is not nervous/anxious. Historical Information   Past Medical History:   Diagnosis Date   • Blind    • Diabetes mellitus ([address])    • Disease of thyroid gland    • Hyperlipidemia    • Hypertension      History reviewed. No pertinent surgical history. Social History   Social History     Substance and Sexual Activity   Alcohol Use Not Currently     Social History     Substance and Sexual Activity   Drug Use Not Currently     Social History     Tobacco Use   Smoking Status Never   Smokeless Tobacco Never     Family History: History reviewed. No pertinent family history.     Meds/Allergies   Current meds:   Current Facility-Administered Medications   Medication Dose Route Frequency   • acetaminophen (TYLENOL) tablet 650 mg  650 mg Oral Q6H PRN   • aluminum-magnesium hydroxide-simethicone (MAALOX) oral suspension 30 mL  30 mL Oral Q6H PRN   • aspirin (ECOTRIN LOW STRENGTH) EC tablet 81 mg  81 mg Oral Daily   • atorvastatin (LIPITOR) tablet 40 mg  40 mg Oral Daily With Dinner   • enoxaparin (LOVENOX) subcutaneous injection 40 mg  40 mg Subcutaneous Daily   • insulin lispro (HumaLOG) 100 units/mL subcutaneous injection 1-5 Units  1-5 Units Subcutaneous 4x Daily (AC & HS)   • levothyroxine tablet 75 mcg  75 mcg Oral Early Morning   • metoprolol tartrate (LOPRESSOR) tablet 50 mg  50 mg Oral Q12H NUHA   • ondansetron (ZOFRAN) injection 4 mg  4 mg Intravenous Q6H PRN   • QUEtiapine (SEROquel) tablet 12.5 mg  12.5 mg Oral HS   • simethicone (MYLICON) chewable tablet 80 mg  80 mg Oral 4x Daily PRN      No Known Allergies    Objective   Vitals: Blood pressure (!) 163/104, pulse 91, temperature 99 °F (37.2 °C), temperature source Oral, resp. rate 16, height 5' 6" (1.676 m), weight 68.8 kg (151 lb 10.8 oz), SpO2 95 %. ,Body mass index is 24.48 kg/m². Physical Exam  Vitals and nursing note reviewed. Constitutional:       General: He is not in acute distress. Appearance: He is not ill-appearing. HENT:      Head: Normocephalic. Mouth/Throat:      Mouth: Mucous membranes are dry. Eyes:      General: No scleral icterus. Conjunctiva/sclera: Conjunctivae normal.   Cardiovascular:      Rate and Rhythm: Normal rate and regular rhythm. Pulmonary:      Effort: Pulmonary effort is normal. No respiratory distress. Abdominal:      General: Bowel sounds are normal. There is no distension. Palpations: Abdomen is soft. Tenderness: There is no abdominal tenderness. Musculoskeletal:         General: No swelling or tenderness. Skin:     General: Skin is warm and dry. Neurological:      Mental Status: He is alert. Mental status is at baseline. He is disoriented. Motor: Weakness present. Gait: Gait abnormal.      Comments: Oriented to person and place  Disoriented to time   Hx of intellectual disability (currently at baseline mentation)         Lab Results:   Results from last 7 days   Lab Units 10/06/23  0432   WBC Thousand/uL 13.37*   HEMOGLOBIN g/dL 13.1   HEMATOCRIT % 40.6   PLATELETS Thousands/uL 255        Results from last 7 days   Lab Units 10/06/23  0425   POTASSIUM mmol/L 3.7   CHLORIDE mmol/L 107   CO2 mmol/L 20*   BUN mg/dL 18   CREATININE mg/dL 1.17   CALCIUM mg/dL 8.6   ALK PHOS U/L 61   ALT U/L 7   AST U/L 10*       Imaging Studies: I have personally reviewed pertinent reports. EKG, Pathology, and Other Studies: I have personally reviewed pertinent reports.     VTE Prophylaxis: Enoxaparin (Lovenox)    Code Status: Level 1 - Full Code      Please note:  Voice-recognition software may have been used in the preparation of this document. Occasional wrong word or "sound-alike" substitutions may have occurred due to the inherent limitations of voice recognition software. Interpretation should be guided by context.

## 2023-10-06 NOTE — H&P
[facility]  H&P  Name: Amando Wilder 58 y.o. male I MRN: [id number]  Unit/Bed#: E5 -01 I Date of Admission: 10/5/2023   Date of Service: 10/6/2023 I Hospital Day: 0      Assessment/Plan   * Syncope and collapse  Assessment & Plan  Family reports patient was walking to the bathroom and complained of feeling dizzy and hot then had a syncopal event. Wife assisted him to the ground. While laying on the ground he felt like he was going to pass out again. Denied seizure-like activity.    -Patient reports dizziness and nausea resolved; reports diarrhea yesterday. He had emesis in ED and was given Zofran and IVF. · CTA H/N:   · Age-indeterminate lacunar infarct in right heidy - no comparison imaging at time of examination. This may be subacute or chronic in age. Recommend MRI brain without contrast for further evaluation.    · Severe chronic microangiopathy. · No large vessel occlusion or hi grade stenosis    · [facility] 8/2022 showing cerebral atrophy with chronic ischemic changes  · NIH 0. No focal deficits. Suspect symptoms secondary to viral gastroenteritis, will defer MRI and consult neurology  · Monitor on telemetry  · Check orthostatic VS  · Neurochecks  · Neurology consult    Vomiting and diarrhea  Assessment & Plan  · Patient having vomiting and diarrhea. Will check lipase and CT A/P  · UA pending collection  · Check stool pcr, fecal leuks, C Diff  · IV Zofran prn  · Diet: clear liquid    Acute kidney injury ([address])  Assessment & Plan  · Creatinine 1.3, baseline 0.8-0.9, likely prerenal due to GI losses  · Received 2L IVF in ED. Will give 1 additional L 0.9NS    · Hold chlorthalidone and lisinopril  · Monitor intake and output/urinary retention protocol  · Avoid NSAIDs, nephrotoxins and hypotension  · Monitor serum creatinine    Hypomagnesemia  Assessment & Plan  · Mg 1.6, likely due to GI losses from emesis  · Will give dose of IV magnesium and repeat a.m. level    Neutrophilic leukocytosis  Assessment & Plan  · WBC 15.6, ANC 13  · Denies cough or shortness of breath. · UA and PCT ordered  · If patient develops diarrhea, check stool study    Essential (primary) hypertension  Assessment & Plan  · Chlorthalidone, lisinopril and metoprolol    Type 2 diabetes mellitus with hyperglycemia, without long-term current use of insulin (HCC)  Assessment & Plan  No results found for: "HGBA1C"  · Hold metformin and Januvia. Add sliding scale insulin and ADA diet  Recent Labs     10/06/23  0122   POCGLU 155*       Blood Sugar Average: Last 72 hrs:  (P) 155    Intellectual disability  Assessment & Plan  · Documented history of learning/Intellectual disability since youth    Mixed hyperlipidemia  Assessment & Plan  · Continue statin    Other specified hypothyroidism  Assessment & Plan  · Levothyroxine 75 mcg       VTE Prophylaxis: score 2  / reason for no mechanical VTE prophylaxis ambulate   Code Status: Full code by default  POLST: POLST is not applicable to this patient  Discussion with family:     Anticipated Length of Stay:  Patient will be admitted on an Observation basis with an anticipated length of stay of  < 2 midnights. Justification for Hospital Stay: syncope, abnormal brain CT    Total Time for Visit, including Counseling / Coordination of Care: 60 minutes. Greater than 50% of this total time spent on direct patient counseling and coordination of care. Chief Complaint:   "I felt bad"    History of Present Illness:    Elizabeth Stewart is a 58 y.o. male who presents with c/o feeling ill with dizziness, nausea and diarrhea. Poor historian. Family brought him in due to syncopal episode at home while in the bathroom. Per ED note as reported by family: wife states that patient was standing up saying that he had to go have a bowel movement where he became dizzy, fatigued and hot and then passed out.   Patient's wife lowered him to the ground he did not hit his head or lose consciousness. He was out several seconds but regained consciousness. No seizure-like activity. No tongue biting or loss of urine. No focal deficits on exam.  Has documented history of intellectual disability. Patient denies headache, weakness, numbness, blurred vision or slurred speech. Review of Systems:    Review of Systems   Unable to perform ROS: Other   Eyes: Negative for visual disturbance. Gastrointestinal: Positive for diarrhea and nausea. Neurological: Positive for dizziness. Negative for weakness, numbness and headaches. Past Medical and Surgical History:     Past Medical History:   Diagnosis Date   • Blind    • Diabetes mellitus ([address])    • Disease of thyroid gland    • Hyperlipidemia    • Hypertension        History reviewed. No pertinent surgical history. Meds/Allergies:    Prior to Admission medications    Medication Sig Start Date End Date Taking? Authorizing Provider   atorvastatin (LIPITOR) 40 mg tablet Take 40 mg by mouth daily   Yes Historical Provider, MD   chlorthalidone 25 mg tablet Take 25 mg by mouth daily   Yes Historical Provider, MD   lisinopril (ZESTRIL) 30 mg tablet Take 30 mg by mouth daily   Yes Historical Provider, MD   metFORMIN (GLUCOPHAGE) 1000 MG tablet Take 1,000 mg by mouth 2 (two) times a day with meals   Yes Historical Provider, MD   metoprolol tartrate (LOPRESSOR) 50 mg tablet Take 50 mg by mouth every 12 (twelve) hours   Yes Historical Provider, MD   sitaGLIPtin (JANUVIA) 100 mg tablet Take 100 mg by mouth daily   Yes Historical Provider, MD     I have reviewed home medications using allscripts.     Allergies: No Known Allergies    Social History:     Marital Status: Single   Occupation: retired  Patient Pre-hospital Living Situation: resides with niece  Patient Pre-hospital Level of Mobility: cane  Patient Pre-hospital Diet Restrictions:   Substance Use History:   Social History     Substance and Sexual Activity   Alcohol Use Not Currently Social History     Tobacco Use   Smoking Status Never   Smokeless Tobacco Never     Social History     Substance and Sexual Activity   Drug Use Not Currently       Family History:    History reviewed. No pertinent family history. Physical Exam:     Vitals:   Blood Pressure: 149/83 (10/06/23 0138)  Pulse: 88 (10/06/23 0138)  Temperature: 97.7 °F (36.5 °C) (10/06/23 0009)  Temp Source: Oral (10/06/23 0009)  Respirations: 16 (10/06/23 0009)  Height: 5' 6" (167.6 cm) (10/06/23 0009)  Weight - Scale: 68.8 kg (151 lb 10.8 oz) (10/06/23 0009)  SpO2: 98 % (10/06/23 0138)    Physical Exam  Constitutional:       General: He is not in acute distress. Appearance: Normal appearance. He is not ill-appearing, toxic-appearing or diaphoretic. Comments: Restless. Appears older than stated age  Underweight   HENT:      Head: Normocephalic and atraumatic. Mouth/Throat:      Mouth: Mucous membranes are moist.      Comments: Edentulous  Eyes:      Pupils: Pupils are equal, round, and reactive to light. Cardiovascular:      Rate and Rhythm: Normal rate and regular rhythm. Pulmonary:      Effort: Pulmonary effort is normal.      Breath sounds: Normal breath sounds. Abdominal:      General: Bowel sounds are normal.      Palpations: Abdomen is soft. Musculoskeletal:      Right lower leg: No edema. Left lower leg: No edema. Skin:     General: Skin is warm and dry. Neurological:      General: No focal deficit present. Mental Status: He is alert. He is disoriented. Comments: Oriented to person and place  Equal strength B/LUE 5/5, B/LLE 4/5. Blind. Unable to follow directions for eye exam   Psychiatric:      Comments: Intellectual disability       Additional Data:     Lab Results: I have personally reviewed pertinent reports.       Results from last 7 days   Lab Units 10/05/23  1901   WBC Thousand/uL 15.65*   HEMOGLOBIN g/dL 14.0   HEMATOCRIT % 44.1   PLATELETS Thousands/uL 286   NEUTROS PCT % 85*   LYMPHS PCT % 9*   MONOS PCT % 5   EOS PCT % 1     Results from last 7 days   Lab Units 10/05/23  1901   SODIUM mmol/L 137   POTASSIUM mmol/L 4.2   CHLORIDE mmol/L 100   CO2 mmol/L 30   BUN mg/dL 16   CREATININE mg/dL 1.38*   ANION GAP mmol/L 7   CALCIUM mg/dL 9.6   ALBUMIN g/dL 4.2   TOTAL BILIRUBIN mg/dL 0.58   ALK PHOS U/L 73   ALT U/L 9   AST U/L 11*   GLUCOSE RANDOM mg/dL 194*     Results from last 7 days   Lab Units 10/05/23  1901   INR  1.18     Results from last 7 days   Lab Units 10/06/23  0122   POC GLUCOSE mg/dl 155*               Imaging: I have personally reviewed pertinent reports. CTA head and neck with and without contrast   Final Result by Carlos Rae MD (10/05 2117)      Age-indeterminate lacunar infarct in right heidy - no comparison imaging at time of examination. This may be subacute or chronic in age. Recommend MRI brain without contrast for further evaluation. Severe chronic microangiopathy. Negative CTA head and neck for large vessel occlusion, dissection, aneurysm, or high-grade stenosis. Additional chronic/incidental findings as detailed above. The study was marked in [facility] for immediate notification. Workstation performed: [id number]         CT abdomen pelvis w contrast    (Results Pending)       EKG, Pathology, and Other Studies Reviewed on Admission:   · [address]    Allscripts / Epic Records Reviewed: Yes     ** Please Note: This note has been constructed using a voice recognition system.  **

## 2023-10-06 NOTE — QUICK NOTE
Notified by RN patient having vomiting and diarrhea.      · Suspect viral gastroenteritis  · Will check lipase and CT A/P  · UA pending collection  · Check stool pcr, fecal leuks, C Diff  · Repeat labs CBC, CMP, Mg, PCT  · Continue IV hydration, IV Zofran prn  · Diet: Downgrade to clear liquid

## 2023-10-09 ENCOUNTER — TELEPHONE (OUTPATIENT)
Dept: NEUROLOGY | Facility: CLINIC | Age: 63
End: 2023-10-09

## 2023-10-09 NOTE — TELEPHONE ENCOUNTER
HFU/ SLA/ Syncope/ [facility]        Notes:   Follow up with vascular neurology as out patient in 4-6 weeks with AP, no need for further neurological testing at this time.        Scheduled:  Kuldeep 11/7/23 10am Kumar

## 2023-11-07 ENCOUNTER — OFFICE VISIT (OUTPATIENT)
Dept: NEUROLOGY | Facility: CLINIC | Age: 63
End: 2023-11-07
Payer: COMMERCIAL

## 2023-11-07 VITALS
TEMPERATURE: 97.9 F | BODY MASS INDEX: 23.3 KG/M2 | DIASTOLIC BLOOD PRESSURE: 72 MMHG | HEIGHT: 66 IN | SYSTOLIC BLOOD PRESSURE: 112 MMHG | WEIGHT: 145 LBS | OXYGEN SATURATION: 98 % | HEART RATE: 82 BPM

## 2023-11-07 DIAGNOSIS — Z86.73 HISTORY OF STROKE: ICD-10-CM

## 2023-11-07 DIAGNOSIS — E11.65 TYPE 2 DIABETES MELLITUS WITH HYPERGLYCEMIA, WITHOUT LONG-TERM CURRENT USE OF INSULIN (HCC): Primary | Chronic | ICD-10-CM

## 2023-11-07 PROCEDURE — 99215 OFFICE O/P EST HI 40 MIN: CPT

## 2023-11-07 NOTE — PROGRESS NOTES
Patient ID: Lindsey Joseph is a 58 y.o. male. Assessment/Plan:    History of stroke  Lindsey Joseph is a 58year old male with history of diabetes, hyperlipidemia and hypertension who presented to [facility] 10/5/23 due to syncope (related to valsalva) and found to have a age indeterminate lacunar infarct of the right heidy. He has since continued on ASA 81 mg and his previously prescribed Atorvastatin 40 mg. His CTA H/N was negative for any large vessel occlusion or severe stenosis. He has a pending echocardiogram ordered by his PCP. Given no current symptoms, it is likely this infarct is chronic. We discussed we could move forward with a MRI Brain although it is unlikely to change our current treatment plan. His niece would prefer to defer this if it is not necessary. He has had no other episodes of syncope. His vascular risk factors appear to be well controlled with the exception of his HgbA1c which is 7.5%. I encouraged them to adhere to a diabetic diet. He will follow up in 8 months; sooner if needed. We reviewed his stroke risk factors and management of the same. He was provided stroke education and we reviewed stroke warning signs/symptoms and reasons to return by ambulance to the ER. Plan:      - Continue with good blood pressure control; I would recommend monitoring at home at least 3 times per week; Goal of <130/80; at goal   - Continue with good cholesterol control; Goal LDL <70; at goal   - Continue with good blood sugar control; Goal HgbA1c <7.0; continue to work on this  - Will defer monitoring of cholesterol and blood sugar and management of hypertensive medications to the primary care provider  - Stay well hydrated by drinking enough water   - Eat a healthy diet, high in lean meats fish, turkey, chicken. Low in fats, cholesterol, sugars and sodium. Avoid canned foods,  get lots of fresh/frozen vegetables/fruits.   - Get routine exercise/physical activity as much as able to tolerate  - Keep follow ups with your other health care providers  - Continue  Aspirin 81 mg daily and Lipitor 40 mg daily. I will plan for him to return to the office in 8 months time but would be happy to see him sooner if the need should arise. If he has any symptoms concerning for TIA or stroke including sudden painless loss of vision or double vision, difficulty speaking or swallowing, vertigo/room spinning that does not quickly resolve, or weakness/numbness affecting 1 side of the face or body he should proceed by ambulance to the nearest emergency room immediately. Type 2 diabetes mellitus with hyperglycemia, without long-term current use of insulin ([facility])    Lab Results   Component Value Date    HGBA1C 7.5 (H) 10/06/2023     HgbA1c goal <7.0  Recommended diabetic diet and routine physical activity. Diagnoses and all orders for this visit:    Type 2 diabetes mellitus with hyperglycemia, without long-term current use of insulin ([address])    History of stroke       I have spent a total time of 45 minutes on 11/07/23 in caring for this patient including Diagnostic results, Prognosis, Risks and benefits of tx options, Instructions for management, Patient and family education, Importance of tx compliance, Risk factor reductions, Impressions, Documenting in the medical record, Reviewing / ordering tests, medicine, procedures  , and Obtaining or reviewing history  . Subjective:    HPI Sheryle Sizer is a 58year old with history of diabetes, hyperlipidemia and hypertension. Presents to [address] [place] [address] on 10/5/2023, with chief complaint of feeling ill with dizziness, nausea and diarrhea. The family brought him in due to a syncopal episode while at home in the bathroom. Per record review, the patient was standing up saying that he did have a bowel movement when became dizzy, fatigued and hot and passed out.       CTA of head and neck was completed - Age-indeterminate lacunar infarct in right heidy - no comparison imaging at time of examination. This may be subacute or chronic in age. Recommend MRI brain without contrast for further evaluation. Severe chronic microangiopathy. Negative CTA head and neck for large vessel occlusion, dissection, aneurysm, or high-grade stenosis. Syncope/collapse -the patient was reported to be feeling dizzy hot prior, as if he was going to pass out, he was having nausea/vomiting and diarrhea yesterday as well. He was found to have a DIEGO and was given IV fluids. It is suspected he may have a viral gastroenteritis. There was no reported seizure activity or lateralizing features to this event. This does not appear to be epileptic in nature does not appear to be in the reference of acute ischemic event. CTA with evidence of age-indeterminate lacunar infarct in the right heidy, suspect this is chronic as he did not have any lateralizing features on description or examination. As well as imaging appears to be chronic . No need for MRI at this time as this looks chronic, with out lateralizing feature by description and limited examination  Echo recommend as the patient also had syncope  Aspirin 81 mg, would add with evidence of stroke on imaging. Atorvastatin 40 mg (home medication, continue)  Frequent neuro checks. Continue to monitor and notify neurology with any changes. Check orthostatic blood pressures  Continue to treat underlying metabolic and infectious derangements, per medicine team - suspect viral gastroenteritis, DIEGO, hypomagnesemia,    - Medical management and supportive care per primary team. Correction of any metabolic or infectious disturbances. - Examined alongside attending     Follow up with vascular neurology as out patient in 4-6 weeks with AP, no need for further neurological testing at this time.      ----------------------------------------------------------------------------------------------------    He is accompanied by his niece today who is also his caregiver. They are both Serbian speaking only, we utilized  services #[id number]. They report they are not aware of any prior stroke. Although, his niece estimates it may have been 6-7 years ago when he was not living with her and was living in the [facility] with other family members. At baseline he is legally blind and is intellectually disabled. Secondary Stroke Prevention  He continues on ASA 81 mg and Atorvastatin 40 mg daily at this time  Compliant with his medications, no issues affording or obtaining medications. Denies excessive bruising or bleeding     Deficits  He has had no other syncopal episodes  He does occasionally have constipation; his last syncopal episode was in the setting of having a hard bowel movement. He is blind so has difficulty walking naturally   Denies numbness, tingling, weakness, dizziness, headaches, new vision changes or any new or worsening stroke like symptoms  Denies difficulty speaking or swallowing. Monitoring  BP today 112/72  BP is monitored at home; readings are usually 120-140/80-85  10/6/23 LDL 20  10/6/23 HgbA1c 7.5  CTA H/N 10/5/23 is without any carotid disease   Has a pending echo ordered by PCP    Safety  He lives with his niece  He requires assistance with ADLs due to sight. He does have a home attendant to assist with ADLs every day for 10 hours.   At baseline he has intellectual disability   No other falls at home  His niece is managing his medications   Assistive devices- none  Driving- does not drive   Memory- confusion at times; related to intellectual disability from birth     Substance use    Smoking- none  Etoh- none  Drugs- none  Caffeine - none     Sleep  He does have some difficulty with confusing night and day and occasionally sleeps during the day and then does not sleep at night   Also has anxiety at bedtime; PCP prescribed Atarax     Diet  He is following a diabetic diet   He has a good appetite    Mood  No anxiety, depression, but occasionally can have agitation or aggression. Follow ups  He follows up routinely with his PCP  Recent UTI dx 10/25/23    The following portions of the patient's history were reviewed and updated as appropriate: allergies, current medications, past family history, past medical history, past social history, past surgical history, and problem list.       Objective:    Blood pressure 112/72, pulse 82, temperature 97.9 °F (36.6 °C), temperature source Temporal, height 5' 6" (1.676 m), weight 65.8 kg (145 lb), SpO2 98 %. Neurological Exam  On neurological examination patient is alert, awake, oriented and in no distress. Speech is fluent without dysarthria or aphasia- Lao speaking. Cranial nerves 2-12 were symmetrically intact bilaterally, with the exception he is legally blind. No evidence of any focal weakness or sensory loss in the upper or lower extremities. Motor testing reveals 5/5 strength of the bilateral upper and lower extremities. There was no pronator drift. No fasciculations present. No abnormal involuntary movements. Sensation was intact to light touch in bilateral upper and lower extremities. Deep tendon reflexes were 2+ and symmetric in the bilateral upper and lower extremities. He is able to rise with assistance from a seated position. He ambulates with the assistance of his niece guiding him. ROS:    Review of Systems   Constitutional:  Negative for appetite change, fatigue and fever. HENT: Negative. Negative for hearing loss, tinnitus, trouble swallowing and voice change. Eyes:  Positive for visual disturbance. Negative for photophobia and pain. Respiratory: Negative. Negative for shortness of breath. Cardiovascular: Negative. Negative for palpitations. Gastrointestinal: Negative. Negative for nausea and vomiting. Endocrine: Negative. Negative for cold intolerance. Genitourinary: Negative.   Negative for dysuria, frequency and urgency. Musculoskeletal:  Negative for back pain, gait problem, myalgias and neck pain. Skin: Negative. Negative for rash. Allergic/Immunologic: Negative. Neurological: Negative. Negative for dizziness, tremors, seizures, syncope, facial asymmetry, speech difficulty, weakness, light-headedness, numbness and headaches. Hematological: Negative. Does not bruise/bleed easily. Psychiatric/Behavioral:  Positive for confusion and sleep disturbance. Negative for hallucinations. All other systems reviewed and are negative. Reviewed ROS as entered by medical assistant.

## 2023-11-07 NOTE — PATIENT INSTRUCTIONS
- Continue with good blood pressure control; I would recommend monitoring at home at least 3 times per week; Goal of <130/80; at goal   - Continue with good cholesterol control; Goal LDL <70; at goal   - Continue with good blood sugar control; Goal HgbA1c <7.0; continue to work on this  - Will defer monitoring of cholesterol and blood sugar and management of hypertensive medications to the primary care provider  - Stay well hydrated by drinking enough water   - Eat a healthy diet, high in lean meats fish, turkey, chicken. Low in fats, cholesterol, sugars and sodium. Avoid canned foods,  get lots of fresh/frozen vegetables/fruits. - Get routine exercise/physical activity as much as able to tolerate  - Keep follow ups with your other health care providers  - Continue  Aspirin 81 mg daily and Lipitor 40 mg daily. I will plan for him to return to the office in 8 months time but would be happy to see him sooner if the need should arise. If he has any symptoms concerning for TIA or stroke including sudden painless loss of vision or double vision, difficulty speaking or swallowing, vertigo/room spinning that does not quickly resolve, or weakness/numbness affecting 1 side of the face or body he should proceed by ambulance to the nearest emergency room immediately.

## 2023-11-07 NOTE — ASSESSMENT & PLAN NOTE
Yessenia Cisse is a 58year old male with history of diabetes, hyperlipidemia and hypertension who presented to [facility] 10/5/23 due to syncope (related to valsalva) and found to have a age indeterminate lacunar infarct of the right heidy. He has since continued on ASA 81 mg and his previously prescribed Atorvastatin 40 mg. His CTA H/N was negative for any large vessel occlusion or severe stenosis. He has a pending echocardiogram ordered by his PCP. Given no current symptoms, it is likely this infarct is chronic. We discussed we could move forward with a MRI Brain although it is unlikely to change our current treatment plan. His niece would prefer to defer this if it is not necessary. He has had no other episodes of syncope. His vascular risk factors appear to be well controlled with the exception of his HgbA1c which is 7.5%. I encouraged them to adhere to a diabetic diet. He will follow up in 8 months; sooner if needed. We reviewed his stroke risk factors and management of the same. He was provided stroke education and we reviewed stroke warning signs/symptoms and reasons to return by ambulance to the ER. Plan:      - Continue with good blood pressure control; I would recommend monitoring at home at least 3 times per week; Goal of <130/80; at goal   - Continue with good cholesterol control; Goal LDL <70; at goal   - Continue with good blood sugar control; Goal HgbA1c <7.0; continue to work on this  - Will defer monitoring of cholesterol and blood sugar and management of hypertensive medications to the primary care provider  - Stay well hydrated by drinking enough water   - Eat a healthy diet, high in lean meats fish, turkey, chicken. Low in fats, cholesterol, sugars and sodium. Avoid canned foods,  get lots of fresh/frozen vegetables/fruits.   - Get routine exercise/physical activity as much as able to tolerate  - Keep follow ups with your other health care providers  - Continue  Aspirin 81 mg daily and Lipitor 40 mg daily. I will plan for him to return to the office in 8 months time but would be happy to see him sooner if the need should arise. If he has any symptoms concerning for TIA or stroke including sudden painless loss of vision or double vision, difficulty speaking or swallowing, vertigo/room spinning that does not quickly resolve, or weakness/numbness affecting 1 side of the face or body he should proceed by ambulance to the nearest emergency room immediately.

## 2023-11-07 NOTE — ASSESSMENT & PLAN NOTE
Lab Results   Component Value Date    HGBA1C 7.5 (H) 10/06/2023     HgbA1c goal <7.0  Recommended diabetic diet and routine physical activity.

## 2024-07-02 ENCOUNTER — TELEPHONE (OUTPATIENT)
Age: 64
End: 2024-07-02

## 2024-07-08 NOTE — TELEPHONE ENCOUNTER
I called patient with Gisella who interrupter to confirm appointment with provider. Patient wife cancel appointment and will call back to schedule.